# Patient Record
Sex: MALE | Race: ASIAN | NOT HISPANIC OR LATINO | Employment: UNEMPLOYED | URBAN - METROPOLITAN AREA
[De-identification: names, ages, dates, MRNs, and addresses within clinical notes are randomized per-mention and may not be internally consistent; named-entity substitution may affect disease eponyms.]

---

## 2023-06-12 ENCOUNTER — APPOINTMENT (OUTPATIENT)
Dept: LAB | Facility: LAB | Age: 78
End: 2023-06-12
Payer: MEDICARE

## 2023-06-12 ENCOUNTER — OFFICE VISIT (OUTPATIENT)
Dept: PRIMARY CARE | Facility: CLINIC | Age: 78
End: 2023-06-12
Payer: MEDICARE

## 2023-06-12 ENCOUNTER — LAB (OUTPATIENT)
Dept: LAB | Facility: LAB | Age: 78
End: 2023-06-12
Payer: MEDICARE

## 2023-06-12 VITALS
DIASTOLIC BLOOD PRESSURE: 68 MMHG | SYSTOLIC BLOOD PRESSURE: 118 MMHG | HEART RATE: 66 BPM | BODY MASS INDEX: 21.03 KG/M2 | WEIGHT: 142 LBS | RESPIRATION RATE: 14 BRPM | HEIGHT: 69 IN

## 2023-06-12 DIAGNOSIS — G89.29 CHRONIC PAIN OF BOTH SHOULDERS: ICD-10-CM

## 2023-06-12 DIAGNOSIS — M25.512 CHRONIC PAIN OF BOTH SHOULDERS: ICD-10-CM

## 2023-06-12 DIAGNOSIS — E11.9 TYPE 2 DIABETES MELLITUS WITHOUT COMPLICATION, WITHOUT LONG-TERM CURRENT USE OF INSULIN (MULTI): ICD-10-CM

## 2023-06-12 DIAGNOSIS — Z00.00 ROUTINE MEDICAL EXAM: ICD-10-CM

## 2023-06-12 DIAGNOSIS — K21.9 GERD WITHOUT ESOPHAGITIS: ICD-10-CM

## 2023-06-12 DIAGNOSIS — I10 PRIMARY HYPERTENSION: ICD-10-CM

## 2023-06-12 DIAGNOSIS — N40.1 BENIGN PROSTATIC HYPERPLASIA WITH URINARY FREQUENCY: ICD-10-CM

## 2023-06-12 DIAGNOSIS — E78.00 PURE HYPERCHOLESTEROLEMIA: ICD-10-CM

## 2023-06-12 DIAGNOSIS — M25.511 CHRONIC PAIN OF BOTH SHOULDERS: ICD-10-CM

## 2023-06-12 DIAGNOSIS — H90.3 SENSORINEURAL HEARING LOSS (SNHL) OF BOTH EARS: ICD-10-CM

## 2023-06-12 DIAGNOSIS — E11.9 TYPE 2 DIABETES MELLITUS WITHOUT COMPLICATION, WITHOUT LONG-TERM CURRENT USE OF INSULIN (MULTI): Primary | ICD-10-CM

## 2023-06-12 DIAGNOSIS — R35.0 BENIGN PROSTATIC HYPERPLASIA WITH URINARY FREQUENCY: ICD-10-CM

## 2023-06-12 PROBLEM — R07.89 OTHER CHEST PAIN: Status: ACTIVE | Noted: 2023-06-12

## 2023-06-12 LAB
ALANINE AMINOTRANSFERASE (SGPT) (U/L) IN SER/PLAS: 14 U/L (ref 10–52)
ALBUMIN (G/DL) IN SER/PLAS: 4.3 G/DL (ref 3.4–5)
ALKALINE PHOSPHATASE (U/L) IN SER/PLAS: 62 U/L (ref 33–136)
ANION GAP IN SER/PLAS: 9 MMOL/L (ref 10–20)
ASPARTATE AMINOTRANSFERASE (SGOT) (U/L) IN SER/PLAS: 18 U/L (ref 9–39)
BILIRUBIN TOTAL (MG/DL) IN SER/PLAS: 0.4 MG/DL (ref 0–1.2)
CALCIUM (MG/DL) IN SER/PLAS: 9.5 MG/DL (ref 8.6–10.3)
CARBON DIOXIDE, TOTAL (MMOL/L) IN SER/PLAS: 31 MMOL/L (ref 21–32)
CHLORIDE (MMOL/L) IN SER/PLAS: 97 MMOL/L (ref 98–107)
CHOLESTEROL (MG/DL) IN SER/PLAS: 175 MG/DL (ref 0–199)
CHOLESTEROL IN HDL (MG/DL) IN SER/PLAS: 62.3 MG/DL
CHOLESTEROL/HDL RATIO: 2.8
CREATININE (MG/DL) IN SER/PLAS: 1.08 MG/DL (ref 0.5–1.3)
ERYTHROCYTE DISTRIBUTION WIDTH (RATIO) BY AUTOMATED COUNT: 13.4 % (ref 11.5–14.5)
ERYTHROCYTE MEAN CORPUSCULAR HEMOGLOBIN CONCENTRATION (G/DL) BY AUTOMATED: 33.2 G/DL (ref 32–36)
ERYTHROCYTE MEAN CORPUSCULAR VOLUME (FL) BY AUTOMATED COUNT: 93 FL (ref 80–100)
ERYTHROCYTES (10*6/UL) IN BLOOD BY AUTOMATED COUNT: 4.27 X10E12/L (ref 4.5–5.9)
GFR MALE: 70 ML/MIN/1.73M2
GLUCOSE (MG/DL) IN SER/PLAS: 116 MG/DL (ref 74–99)
HEMATOCRIT (%) IN BLOOD BY AUTOMATED COUNT: 39.7 % (ref 41–52)
HEMOGLOBIN (G/DL) IN BLOOD: 13.2 G/DL (ref 13.5–17.5)
LDL: 94 MG/DL (ref 0–99)
LEUKOCYTES (10*3/UL) IN BLOOD BY AUTOMATED COUNT: 3.3 X10E9/L (ref 4.4–11.3)
PLATELETS (10*3/UL) IN BLOOD AUTOMATED COUNT: 172 X10E9/L (ref 150–450)
POTASSIUM (MMOL/L) IN SER/PLAS: 4.1 MMOL/L (ref 3.5–5.3)
PROTEIN TOTAL: 6.4 G/DL (ref 6.4–8.2)
SODIUM (MMOL/L) IN SER/PLAS: 133 MMOL/L (ref 136–145)
TRIGLYCERIDE (MG/DL) IN SER/PLAS: 93 MG/DL (ref 0–149)
UREA NITROGEN (MG/DL) IN SER/PLAS: 18 MG/DL (ref 6–23)
VLDL: 19 MG/DL (ref 0–40)

## 2023-06-12 PROCEDURE — 3078F DIAST BP <80 MM HG: CPT | Performed by: FAMILY MEDICINE

## 2023-06-12 PROCEDURE — 85027 COMPLETE CBC AUTOMATED: CPT

## 2023-06-12 PROCEDURE — 1160F RVW MEDS BY RX/DR IN RCRD: CPT | Performed by: FAMILY MEDICINE

## 2023-06-12 PROCEDURE — G0439 PPPS, SUBSEQ VISIT: HCPCS | Performed by: FAMILY MEDICINE

## 2023-06-12 PROCEDURE — 1159F MED LIST DOCD IN RCRD: CPT | Performed by: FAMILY MEDICINE

## 2023-06-12 PROCEDURE — 36415 COLL VENOUS BLD VENIPUNCTURE: CPT

## 2023-06-12 PROCEDURE — 80061 LIPID PANEL: CPT

## 2023-06-12 PROCEDURE — 1036F TOBACCO NON-USER: CPT | Performed by: FAMILY MEDICINE

## 2023-06-12 PROCEDURE — 3074F SYST BP LT 130 MM HG: CPT | Performed by: FAMILY MEDICINE

## 2023-06-12 PROCEDURE — 82043 UR ALBUMIN QUANTITATIVE: CPT

## 2023-06-12 PROCEDURE — 80053 COMPREHEN METABOLIC PANEL: CPT

## 2023-06-12 PROCEDURE — 82570 ASSAY OF URINE CREATININE: CPT

## 2023-06-12 PROCEDURE — 99214 OFFICE O/P EST MOD 30 MIN: CPT | Performed by: FAMILY MEDICINE

## 2023-06-12 PROCEDURE — 83036 HEMOGLOBIN GLYCOSYLATED A1C: CPT

## 2023-06-12 PROCEDURE — 1170F FXNL STATUS ASSESSED: CPT | Performed by: FAMILY MEDICINE

## 2023-06-12 RX ORDER — METFORMIN HYDROCHLORIDE 500 MG/1
1 TABLET ORAL 2 TIMES DAILY
COMMUNITY
Start: 2022-12-09 | End: 2023-06-12 | Stop reason: SDUPTHER

## 2023-06-12 RX ORDER — FINASTERIDE 5 MG/1
5 TABLET, FILM COATED ORAL DAILY
Qty: 90 TABLET | Refills: 3 | Status: SHIPPED | OUTPATIENT
Start: 2023-06-12 | End: 2023-09-14 | Stop reason: SDUPTHER

## 2023-06-12 RX ORDER — TRIAMCINOLONE ACETONIDE 1 MG/G
CREAM TOPICAL 2 TIMES DAILY
COMMUNITY
Start: 2022-08-23

## 2023-06-12 RX ORDER — PRAVASTATIN SODIUM 40 MG/1
40 TABLET ORAL NIGHTLY
Qty: 90 TABLET | Refills: 3 | Status: SHIPPED | OUTPATIENT
Start: 2023-06-12 | End: 2023-09-14 | Stop reason: SDUPTHER

## 2023-06-12 RX ORDER — TAMSULOSIN HYDROCHLORIDE 0.4 MG/1
0.4 CAPSULE ORAL 2 TIMES DAILY
Qty: 180 CAPSULE | Refills: 3 | Status: SHIPPED | OUTPATIENT
Start: 2023-06-12 | End: 2023-09-14 | Stop reason: SDUPTHER

## 2023-06-12 RX ORDER — HYDRALAZINE HYDROCHLORIDE 10 MG/1
TABLET, FILM COATED ORAL
COMMUNITY
Start: 2023-06-11 | End: 2023-09-14 | Stop reason: SDUPTHER

## 2023-06-12 RX ORDER — OMEPRAZOLE 40 MG/1
40 CAPSULE, DELAYED RELEASE ORAL
Qty: 90 CAPSULE | Refills: 3 | Status: SHIPPED | OUTPATIENT
Start: 2023-06-12 | End: 2023-09-14 | Stop reason: ALTCHOICE

## 2023-06-12 RX ORDER — PRAVASTATIN SODIUM 40 MG/1
1 TABLET ORAL NIGHTLY
COMMUNITY
Start: 2022-12-04 | End: 2023-06-12 | Stop reason: SDUPTHER

## 2023-06-12 RX ORDER — FINASTERIDE 5 MG/1
1 TABLET, FILM COATED ORAL DAILY
COMMUNITY
Start: 2022-12-04 | End: 2023-06-12 | Stop reason: SDUPTHER

## 2023-06-12 RX ORDER — METFORMIN HYDROCHLORIDE 500 MG/1
500 TABLET ORAL 2 TIMES DAILY
Qty: 180 TABLET | Refills: 3 | Status: SHIPPED | OUTPATIENT
Start: 2023-06-12 | End: 2023-09-14 | Stop reason: SDUPTHER

## 2023-06-12 RX ORDER — LISINOPRIL 10 MG/1
10 TABLET ORAL 2 TIMES DAILY
Qty: 90 TABLET | Refills: 3 | Status: SHIPPED | OUTPATIENT
Start: 2023-06-12 | End: 2023-09-14 | Stop reason: SDUPTHER

## 2023-06-12 RX ORDER — HYDROGEN PEROXIDE 3 %
SOLUTION, NON-ORAL MISCELLANEOUS
COMMUNITY
Start: 2022-12-17 | End: 2023-09-14 | Stop reason: SDUPTHER

## 2023-06-12 RX ORDER — TAMSULOSIN HYDROCHLORIDE 0.4 MG/1
0.4 CAPSULE ORAL 2 TIMES DAILY
COMMUNITY
Start: 2022-08-23 | End: 2023-06-12 | Stop reason: SDUPTHER

## 2023-06-12 RX ORDER — CETIRIZINE HYDROCHLORIDE 10 MG/1
10 TABLET ORAL DAILY
COMMUNITY
Start: 2022-08-01

## 2023-06-12 RX ORDER — LISINOPRIL 10 MG/1
1 TABLET ORAL 2 TIMES DAILY
COMMUNITY
Start: 2022-12-04 | End: 2023-06-12 | Stop reason: SDUPTHER

## 2023-06-12 ASSESSMENT — ACTIVITIES OF DAILY LIVING (ADL)
TAKING_MEDICATION: INDEPENDENT
MANAGING_FINANCES: INDEPENDENT
DOING_HOUSEWORK: INDEPENDENT
BATHING: NEEDS ASSISTANCE
GROCERY_SHOPPING: INDEPENDENT
DRESSING: INDEPENDENT

## 2023-06-12 ASSESSMENT — PATIENT HEALTH QUESTIONNAIRE - PHQ9
SUM OF ALL RESPONSES TO PHQ9 QUESTIONS 1 AND 2: 0
1. LITTLE INTEREST OR PLEASURE IN DOING THINGS: NOT AT ALL
2. FEELING DOWN, DEPRESSED OR HOPELESS: NOT AT ALL

## 2023-06-12 NOTE — PROGRESS NOTES
"Subjective   Patient ID: Yinka Wasserman is a 78 y.o. male who presents for Medicare Annual Wellness Visit Subsequent (Follow up).    HPI   Patient has been compliant with taking all  current medications. FBG has been at 100's most days. No hypoglycemic episodes. No polydipsia, polyuria. No lower extremities skin lesion. stable LE numbness and  tingling. No blurry vision. No GI upset  or muscle ache while on statin for high lipids. Normal appetite. No CP, HA, dizziness, heart palpitation. No claudication or cold LE.  No LE edema. No imbalance or falls. Good mood.     Review of Systems    Objective   /68   Pulse 66   Resp 14   Ht 1.753 m (5' 9\")   Wt 64.4 kg (142 lb)   BMI 20.97 kg/m²     Physical Exam  NAD, well groomed, No sclera icterus. + b/l hearing loss, neck: supple, no cervical or axillary lymphadenopathy,  lungs: CTA b/l, heart: RRR, No LE edema, normal pedal pulses, abd: soft, no tenderness, BS+, normal strength and sensation  at bilateral lower extremities. No skin lesions at bilateral feet.  Good balance. CNII-XII were grossly intact, good judgment and memory. No depressed mood.  Assessment/Plan   Problem List Items Addressed This Visit          Circulatory    Primary hypertension     BP has been controlled. Continue BP pills. DASH diet and regular exercise.          Relevant Medications    lisinopril 10 mg tablet    Other Relevant Orders    CBC    Comprehensive Metabolic Panel       Digestive    GERD without esophagitis     GERD:  Acid reflux has been well controlled with current ppi. Continue the same. Informed pt of the SE of PPI such as kidney disease, low magnesium, C-dif infection, dementia, osteoporosis/fracture etc. Recommend to avoid lying down within 4 hours of eating food.  Elevate the head of bed by one foot. Avoid EOTH, mint, citrus, chocolate. Pt declined to see a GI for further evaluation.           Relevant Medications    omeprazole (PriLOSEC) 40 mg DR capsule       " Musculoskeletal    Chronic pain of both shoulders     Stretch exercise, Call office if symptoms do not resolve in 10 days           Relevant Orders    CBC       Endocrine/Metabolic    Type 2 diabetes mellitus without complication, without long-term current use of insulin (CMS/[facility]) - Primary     DMII, well controlled. Continue current medications. Check A1C, urine albumin. advise eye exam by an OD yearly and checking bilateral feet for skin lesions qhs. Healthy diet and regular exercise.          Relevant Medications    metFORMIN (Glucophage) 500 mg tablet    Other Relevant Orders    Albumin , Urine Random    Hemoglobin A1C       Other    Routine medical exam    Sensorineural hearing loss (SNHL) of both ears     Ent eval         Relevant Orders    Referral to ENT    Pure hypercholesterolemia     Hyperlipidemia on statin. No GI upset or muscle ache. check labs for evaluation.  Health diet and regular exercise.          Relevant Medications    pravastatin (Pravachol) 40 mg tablet    Other Relevant Orders    CBC    Lipid Panel    Benign prostatic hyperplasia with urinary frequency     Pt cont to have urinary frequency. Uro eval         Relevant Medications    finasteride (Proscar) 5 mg tablet    tamsulosin (Flomax) 0.4 mg 24 hr capsule    Other Relevant Orders    Referral to Urology        Medicare wellness visit: pt was capable of performing all his ADLs and IADLs. Pt has good memory and cognitive function. Recommend healthy diet and regular exercise. Advise eye exam by an OD yearly for glaucoma screen and dental exam every 6 months. check lipids.   will monitor blood pressure, cholesterol levels and weight regularly. Recommend sunscreen application if exposed to the sun when the UV index is over 2. Recommend shingle vaccines, hep B vaccine,  pcv20. Recommend pt to clear throw rugs at home and keep good lighting at night to avoid falls. Keep hot water for shower below 120F to avoid burn injury.  recommend grab bar at  bathtub.   recommend to update living will and DPOA  pt stated that he had been taking all current  medications as prescribed.

## 2023-06-12 NOTE — ASSESSMENT & PLAN NOTE
DMII, well controlled. Continue current medications. Check A1C, urine albumin. advise eye exam by an OD yearly and checking bilateral feet for skin lesions qhs. Healthy diet and regular exercise.

## 2023-06-12 NOTE — ASSESSMENT & PLAN NOTE
GERD:  Acid reflux has been well controlled with current ppi. Continue the same. Informed pt of the SE of PPI such as kidney disease, low magnesium, C-dif infection, dementia, osteoporosis/fracture etc. Recommend to avoid lying down within 4 hours of eating food.  Elevate the head of bed by one foot. Avoid EOTH, mint, citrus, chocolate. Pt declined to see a GI for further evaluation.

## 2023-06-12 NOTE — ASSESSMENT & PLAN NOTE
Hyperlipidemia on statin. No GI upset or muscle ache. check labs for evaluation.  Health diet and regular exercise.

## 2023-06-13 LAB
ALBUMIN (MG/L) IN URINE: 7.6 MG/L
ALBUMIN/CREATININE (UG/MG) IN URINE: 5.1 UG/MG CRT (ref 0–30)
CREATININE (MG/DL) IN URINE: 150 MG/DL (ref 20–370)
ESTIMATED AVERAGE GLUCOSE FOR HBA1C: 128 MG/DL
HEMOGLOBIN A1C/HEMOGLOBIN TOTAL IN BLOOD: 6.1 %

## 2023-06-19 ENCOUNTER — LAB (OUTPATIENT)
Dept: LAB | Facility: LAB | Age: 78
End: 2023-06-19
Payer: MEDICARE

## 2023-06-19 ENCOUNTER — OFFICE VISIT (OUTPATIENT)
Dept: PRIMARY CARE | Facility: CLINIC | Age: 78
End: 2023-06-19
Payer: MEDICARE

## 2023-06-19 VITALS — HEART RATE: 76 BPM | RESPIRATION RATE: 14 BRPM | SYSTOLIC BLOOD PRESSURE: 128 MMHG | DIASTOLIC BLOOD PRESSURE: 79 MMHG

## 2023-06-19 DIAGNOSIS — D64.9 ANEMIA, UNSPECIFIED TYPE: Primary | ICD-10-CM

## 2023-06-19 DIAGNOSIS — D72.819 LEUKOPENIA, UNSPECIFIED TYPE: ICD-10-CM

## 2023-06-19 DIAGNOSIS — E87.1 HYPONATREMIA: ICD-10-CM

## 2023-06-19 DIAGNOSIS — D64.9 ANEMIA, UNSPECIFIED TYPE: ICD-10-CM

## 2023-06-19 LAB
COBALAMIN (VITAMIN B12) (PG/ML) IN SER/PLAS: 372 PG/ML (ref 211–911)
FERRITIN (UG/LL) IN SER/PLAS: 86 UG/L (ref 20–300)
FOLATE (NG/ML) IN SER/PLAS: 14 NG/ML
IRON (UG/DL) IN SER/PLAS: 111 UG/DL (ref 35–150)
IRON BINDING CAPACITY (UG/DL) IN SER/PLAS: 358 UG/DL (ref 240–445)
IRON SATURATION (%) IN SER/PLAS: 31 % (ref 25–45)

## 2023-06-19 PROCEDURE — 36415 COLL VENOUS BLD VENIPUNCTURE: CPT

## 2023-06-19 PROCEDURE — 82607 VITAMIN B-12: CPT

## 2023-06-19 PROCEDURE — 1160F RVW MEDS BY RX/DR IN RCRD: CPT | Performed by: FAMILY MEDICINE

## 2023-06-19 PROCEDURE — 3074F SYST BP LT 130 MM HG: CPT | Performed by: FAMILY MEDICINE

## 2023-06-19 PROCEDURE — 1036F TOBACCO NON-USER: CPT | Performed by: FAMILY MEDICINE

## 2023-06-19 PROCEDURE — 3078F DIAST BP <80 MM HG: CPT | Performed by: FAMILY MEDICINE

## 2023-06-19 PROCEDURE — 99214 OFFICE O/P EST MOD 30 MIN: CPT | Performed by: FAMILY MEDICINE

## 2023-06-19 PROCEDURE — 83550 IRON BINDING TEST: CPT

## 2023-06-19 PROCEDURE — 82746 ASSAY OF FOLIC ACID SERUM: CPT

## 2023-06-19 PROCEDURE — 82728 ASSAY OF FERRITIN: CPT

## 2023-06-19 PROCEDURE — 1159F MED LIST DOCD IN RCRD: CPT | Performed by: FAMILY MEDICINE

## 2023-06-19 PROCEDURE — 83540 ASSAY OF IRON: CPT

## 2023-06-19 NOTE — PROGRESS NOTES
Subjective   Patient ID: Yinka Wasserman is a 78 y.o. male who presents for anemia and low Na+  HPI   Hgb, wbc and NA+ were low. No abd pain, dark stools. Nausea, confusion or poor appetite. Good balance  Review of Systems    Objective   /79   Pulse 76   Resp 14     Physical Exam  NAD, well groomed, no pale conjunctiva, No sclera icterus. neck: supple, no cervical or axillary lymphadenopathy,  lungs: CTA b/l, heart: RRR, cap refill was less than 2 secs, No LE edema, Good balance. CNII-XII were grossly intact, good judgment and memory. No depressed mood.    Assessment/Plan   Problem List Items Addressed This Visit       Anemia - Primary     Check labs for  eval         Relevant Orders    Vitamin B12 (Completed)    Iron and TIBC    Ferritin    Folate    Hyponatremia     Restrict fluid intake to 1500 ml a day. Will monitor.          Leukopenia     Check labs for  eval

## 2023-09-14 ENCOUNTER — OFFICE VISIT (OUTPATIENT)
Dept: PRIMARY CARE | Facility: CLINIC | Age: 78
End: 2023-09-14
Payer: MEDICARE

## 2023-09-14 VITALS — SYSTOLIC BLOOD PRESSURE: 130 MMHG | RESPIRATION RATE: 14 BRPM | DIASTOLIC BLOOD PRESSURE: 68 MMHG | HEART RATE: 76 BPM

## 2023-09-14 DIAGNOSIS — R35.0 BENIGN PROSTATIC HYPERPLASIA WITH URINARY FREQUENCY: ICD-10-CM

## 2023-09-14 DIAGNOSIS — E87.1 HYPONATREMIA: ICD-10-CM

## 2023-09-14 DIAGNOSIS — E78.00 PURE HYPERCHOLESTEROLEMIA: ICD-10-CM

## 2023-09-14 DIAGNOSIS — N40.1 BENIGN PROSTATIC HYPERPLASIA WITH URINARY FREQUENCY: ICD-10-CM

## 2023-09-14 DIAGNOSIS — D64.9 ANEMIA, UNSPECIFIED TYPE: ICD-10-CM

## 2023-09-14 DIAGNOSIS — K21.9 GERD WITHOUT ESOPHAGITIS: Primary | ICD-10-CM

## 2023-09-14 DIAGNOSIS — I10 PRIMARY HYPERTENSION: ICD-10-CM

## 2023-09-14 DIAGNOSIS — E11.9 TYPE 2 DIABETES MELLITUS WITHOUT COMPLICATION, WITHOUT LONG-TERM CURRENT USE OF INSULIN (MULTI): ICD-10-CM

## 2023-09-14 PROBLEM — R07.89 OTHER CHEST PAIN: Status: RESOLVED | Noted: 2023-06-12 | Resolved: 2023-09-14

## 2023-09-14 PROCEDURE — 1160F RVW MEDS BY RX/DR IN RCRD: CPT | Performed by: FAMILY MEDICINE

## 2023-09-14 PROCEDURE — 90686 IIV4 VACC NO PRSV 0.5 ML IM: CPT | Performed by: FAMILY MEDICINE

## 2023-09-14 PROCEDURE — G0008 ADMIN INFLUENZA VIRUS VAC: HCPCS | Performed by: FAMILY MEDICINE

## 2023-09-14 PROCEDURE — 3075F SYST BP GE 130 - 139MM HG: CPT | Performed by: FAMILY MEDICINE

## 2023-09-14 PROCEDURE — 1036F TOBACCO NON-USER: CPT | Performed by: FAMILY MEDICINE

## 2023-09-14 PROCEDURE — 1159F MED LIST DOCD IN RCRD: CPT | Performed by: FAMILY MEDICINE

## 2023-09-14 PROCEDURE — 3078F DIAST BP <80 MM HG: CPT | Performed by: FAMILY MEDICINE

## 2023-09-14 PROCEDURE — 99214 OFFICE O/P EST MOD 30 MIN: CPT | Performed by: FAMILY MEDICINE

## 2023-09-14 RX ORDER — LISINOPRIL 10 MG/1
10 TABLET ORAL 2 TIMES DAILY
Qty: 90 TABLET | Refills: 3 | Status: SHIPPED | OUTPATIENT
Start: 2023-09-14 | End: 2023-12-11

## 2023-09-14 RX ORDER — HYDROGEN PEROXIDE 3 %
SOLUTION, NON-ORAL MISCELLANEOUS
Qty: 180 CAPSULE | Refills: 3 | Status: SHIPPED | OUTPATIENT
Start: 2023-09-14 | End: 2023-12-12 | Stop reason: ALTCHOICE

## 2023-09-14 RX ORDER — METFORMIN HYDROCHLORIDE 500 MG/1
500 TABLET ORAL 2 TIMES DAILY
Qty: 180 TABLET | Refills: 3 | Status: SHIPPED | OUTPATIENT
Start: 2023-09-14

## 2023-09-14 RX ORDER — TAMSULOSIN HYDROCHLORIDE 0.4 MG/1
0.4 CAPSULE ORAL 2 TIMES DAILY
Qty: 180 CAPSULE | Refills: 3 | Status: SHIPPED | OUTPATIENT
Start: 2023-09-14

## 2023-09-14 RX ORDER — HYDRALAZINE HYDROCHLORIDE 10 MG/1
TABLET, FILM COATED ORAL
Qty: 180 TABLET | Refills: 3 | Status: SHIPPED | OUTPATIENT
Start: 2023-09-14

## 2023-09-14 RX ORDER — PRAVASTATIN SODIUM 40 MG/1
40 TABLET ORAL NIGHTLY
Qty: 90 TABLET | Refills: 3 | Status: SHIPPED | OUTPATIENT
Start: 2023-09-14 | End: 2023-12-13 | Stop reason: ALTCHOICE

## 2023-09-14 RX ORDER — FINASTERIDE 5 MG/1
5 TABLET, FILM COATED ORAL DAILY
Qty: 90 TABLET | Refills: 3 | Status: SHIPPED | OUTPATIENT
Start: 2023-09-14

## 2023-09-14 NOTE — ASSESSMENT & PLAN NOTE
Hyperlipidemia on statin. No GI upset or muscle ache. Will check labs for evaluation.  Health diet and regular exercise.

## 2023-09-14 NOTE — ASSESSMENT & PLAN NOTE
DMII, well controlled. Continue current medications. Will Check A1C, urine albumin. advise eye exam by an OD yearly and checking bilateral feet for skin lesions qhs.

## 2023-09-14 NOTE — ASSESSMENT & PLAN NOTE
GERD:  Acid reflux has been well controlled with current ppi. Continue the same. Informed pt of the SE of PPI such as kidney disease, low magnesium, C-dif infection, dementia, osteoporosis/fracture etc. Recommend to avoid lying down within 4 hours of eating food.  Elevate the head of bed by one foot. Avoid EOTH, mint, citrus, chocolate.

## 2023-09-14 NOTE — PROGRESS NOTES
Subjective   Patient ID: Yinka Wasserman is a 78 y.o. male who presents for fu  HPI   Patient has been compliant with taking all  current medications. FBG has been at 110's most days. No hypoglycemic episodes. No polydipsia, polyuria or significant weight changes. No lower extremities skin lesion. No LE numbness or tingling. No blurry vision. No GI upset  or muscle ache while on statin for high lipids. Normal appetite. No CP, HA, dizziness, heart palpitation. No claudication or cold LE.  No LE edema. No imbalance or falls. Good mood. symptoms of acid reflux and heart burn have been well controlled with current ppi. Pt denies having cough or wheezing. No trouble swallowing food. Pt has normal appetite. No nausea, vomiting, abd pain, wt loss, dark stools. Stable urination. No dark stools or fatigue    Review of Systems    Objective   /68   Pulse 76   Resp 14     Physical Exam  NAD, well groomed, No sclera icterus. neck: supple, no cervical or axillary lymphadenopathy,  lungs: CTA b/l, heart: RRR, cap refill was less than 2 secs, No LE edema, normal pedal pulses, abd: soft, no tenderness, BS+, normal strength and sensation  at bilateral lower extremities. No skin lesions at bilateral feet.  Good balance. CNII-XII were grossly intact, good judgment and memory. No depressed mood.    Assessment/Plan   Problem List Items Addressed This Visit       Type 2 diabetes mellitus without complication, without long-term current use of insulin (CMS/[facility])     DMII, well controlled. Continue current medications. Will Check A1C, urine albumin. advise eye exam by an OD yearly and checking bilateral feet for skin lesions qhs.          Relevant Medications    metFORMIN (Glucophage) 500 mg tablet    Pure hypercholesterolemia     Hyperlipidemia on statin. No GI upset or muscle ache. Will check labs for evaluation.  Health diet and regular exercise.          Relevant Medications    pravastatin (Pravachol) 40 mg tablet    Primary  hypertension     BP has been controlled. Continue BP pills. keep a daily  bp log and bring in the log at the next office visit. Call office if BP is persistently over 130/80. DASH diet and regular exercise.          Relevant Medications    hydrALAZINE (Apresoline) 10 mg tablet    lisinopril 10 mg tablet    Benign prostatic hyperplasia with urinary frequency    Relevant Medications    finasteride (Proscar) 5 mg tablet    tamsulosin (Flomax) 0.4 mg 24 hr capsule    GERD without esophagitis - Primary     GERD:  Acid reflux has been well controlled with current ppi. Continue the same. Informed pt of the SE of PPI such as kidney disease, low magnesium, C-dif infection, dementia, osteoporosis/fracture etc. Recommend to avoid lying down within 4 hours of eating food.  Elevate the head of bed by one foot. Avoid EOTH, mint, citrus, chocolate.          Relevant Medications    esomeprazole (NexIUM) 20 mg DR capsule    Anemia     Check cbc         Relevant Orders    CBC    Hyponatremia     Check bmp         Relevant Orders    Basic Metabolic Panel     Patient is not sick today. Patient is not anxious about getting a shot today. Patient has never had Guillain Barré syndrome. Patient has never felt dizzy or faint before, during, or after a shot. Patient has never had a serious reaction to influenza vaccine in the past.  Patient has never had an allergy to an ingredient of influenza vaccine.  Flu shot was given via IM today.

## 2023-09-15 ENCOUNTER — LAB (OUTPATIENT)
Dept: LAB | Facility: LAB | Age: 78
End: 2023-09-15
Payer: MEDICARE

## 2023-09-15 DIAGNOSIS — D64.9 ANEMIA, UNSPECIFIED TYPE: ICD-10-CM

## 2023-09-15 DIAGNOSIS — D72.819 LEUKOPENIA, UNSPECIFIED TYPE: Primary | ICD-10-CM

## 2023-09-15 DIAGNOSIS — E87.1 HYPONATREMIA: ICD-10-CM

## 2023-09-15 LAB
ANION GAP IN SER/PLAS: 11 MMOL/L (ref 10–20)
CALCIUM (MG/DL) IN SER/PLAS: 9.1 MG/DL (ref 8.6–10.3)
CARBON DIOXIDE, TOTAL (MMOL/L) IN SER/PLAS: 30 MMOL/L (ref 21–32)
CHLORIDE (MMOL/L) IN SER/PLAS: 101 MMOL/L (ref 98–107)
CREATININE (MG/DL) IN SER/PLAS: 1.12 MG/DL (ref 0.5–1.3)
ERYTHROCYTE DISTRIBUTION WIDTH (RATIO) BY AUTOMATED COUNT: 12.5 % (ref 11.5–14.5)
ERYTHROCYTE MEAN CORPUSCULAR HEMOGLOBIN CONCENTRATION (G/DL) BY AUTOMATED: 33.2 G/DL (ref 32–36)
ERYTHROCYTE MEAN CORPUSCULAR VOLUME (FL) BY AUTOMATED COUNT: 93 FL (ref 80–100)
ERYTHROCYTES (10*6/UL) IN BLOOD BY AUTOMATED COUNT: 4.68 X10E12/L (ref 4.5–5.9)
GFR MALE: 67 ML/MIN/1.73M2
GLUCOSE (MG/DL) IN SER/PLAS: 155 MG/DL (ref 74–99)
HEMATOCRIT (%) IN BLOOD BY AUTOMATED COUNT: 43.4 % (ref 41–52)
HEMOGLOBIN (G/DL) IN BLOOD: 14.4 G/DL (ref 13.5–17.5)
LEUKOCYTES (10*3/UL) IN BLOOD BY AUTOMATED COUNT: 2.8 X10E9/L (ref 4.4–11.3)
PLATELETS (10*3/UL) IN BLOOD AUTOMATED COUNT: 154 X10E9/L (ref 150–450)
POTASSIUM (MMOL/L) IN SER/PLAS: 4.5 MMOL/L (ref 3.5–5.3)
SODIUM (MMOL/L) IN SER/PLAS: 137 MMOL/L (ref 136–145)
UREA NITROGEN (MG/DL) IN SER/PLAS: 21 MG/DL (ref 6–23)

## 2023-09-15 PROCEDURE — 80048 BASIC METABOLIC PNL TOTAL CA: CPT

## 2023-09-15 PROCEDURE — 36415 COLL VENOUS BLD VENIPUNCTURE: CPT

## 2023-09-15 PROCEDURE — 85027 COMPLETE CBC AUTOMATED: CPT

## 2023-09-23 PROBLEM — R07.89 OTHER CHEST PAIN: Status: ACTIVE | Noted: 2023-09-23

## 2023-09-23 PROBLEM — J30.9 ALLERGIC RHINITIS: Status: ACTIVE | Noted: 2023-08-30

## 2023-09-23 PROBLEM — R35.1 NOCTURIA: Status: ACTIVE | Noted: 2023-09-23

## 2023-09-23 PROBLEM — N31.8 FREQUENCY-URGENCY SYNDROME: Status: ACTIVE | Noted: 2023-09-23

## 2023-09-23 PROBLEM — R15.9 STOOL INCONTINENCE: Status: ACTIVE | Noted: 2023-09-23

## 2023-09-23 RX ORDER — FAMOTIDINE 20 MG/1
20 TABLET, FILM COATED ORAL 2 TIMES DAILY
COMMUNITY

## 2023-09-23 RX ORDER — ESOMEPRAZOLE MAGNESIUM 40 MG/1
1 CAPSULE, DELAYED RELEASE ORAL DAILY PRN
COMMUNITY
Start: 2023-02-06

## 2023-09-23 RX ORDER — FLUTICASONE PROPIONATE 50 MCG
SPRAY, SUSPENSION (ML) NASAL
COMMUNITY
Start: 2021-11-02

## 2023-09-23 RX ORDER — OXYBUTYNIN CHLORIDE 5 MG/1
1 TABLET ORAL 2 TIMES DAILY
COMMUNITY
Start: 2023-07-03

## 2023-09-23 RX ORDER — OMEPRAZOLE 20 MG/1
20 CAPSULE, DELAYED RELEASE ORAL DAILY
COMMUNITY
Start: 2022-10-20 | End: 2023-12-12 | Stop reason: ALTCHOICE

## 2023-09-23 RX ORDER — AZELASTINE 1 MG/ML
2 SPRAY, METERED NASAL 2 TIMES DAILY
COMMUNITY

## 2023-10-23 ENCOUNTER — APPOINTMENT (OUTPATIENT)
Dept: UROLOGY | Facility: CLINIC | Age: 78
End: 2023-10-23
Payer: MEDICARE

## 2023-10-25 ENCOUNTER — OFFICE VISIT (OUTPATIENT)
Dept: PRIMARY CARE | Facility: CLINIC | Age: 78
End: 2023-10-25
Payer: MEDICARE

## 2023-10-25 VITALS — HEART RATE: 76 BPM | DIASTOLIC BLOOD PRESSURE: 76 MMHG | SYSTOLIC BLOOD PRESSURE: 132 MMHG | RESPIRATION RATE: 14 BRPM

## 2023-10-25 DIAGNOSIS — L02.519: Primary | ICD-10-CM

## 2023-10-25 PROCEDURE — 87075 CULTR BACTERIA EXCEPT BLOOD: CPT

## 2023-10-25 PROCEDURE — 1160F RVW MEDS BY RX/DR IN RCRD: CPT | Performed by: FAMILY MEDICINE

## 2023-10-25 PROCEDURE — 10060 I&D ABSCESS SIMPLE/SINGLE: CPT | Performed by: FAMILY MEDICINE

## 2023-10-25 PROCEDURE — 87070 CULTURE OTHR SPECIMN AEROBIC: CPT

## 2023-10-25 PROCEDURE — 1159F MED LIST DOCD IN RCRD: CPT | Performed by: FAMILY MEDICINE

## 2023-10-25 PROCEDURE — 3078F DIAST BP <80 MM HG: CPT | Performed by: FAMILY MEDICINE

## 2023-10-25 PROCEDURE — 1036F TOBACCO NON-USER: CPT | Performed by: FAMILY MEDICINE

## 2023-10-25 PROCEDURE — 99213 OFFICE O/P EST LOW 20 MIN: CPT | Performed by: FAMILY MEDICINE

## 2023-10-25 PROCEDURE — 3075F SYST BP GE 130 - 139MM HG: CPT | Performed by: FAMILY MEDICINE

## 2023-10-25 RX ORDER — DOXYCYCLINE 100 MG/1
100 CAPSULE ORAL 2 TIMES DAILY
Qty: 14 CAPSULE | Refills: 0 | Status: SHIPPED | OUTPATIENT
Start: 2023-10-25 | End: 2023-11-01

## 2023-10-25 NOTE — PROGRESS NOTES
Subjective   Patient ID: Yinka Wasserman is a 78 y.o. male who presents for left 5th swelling for 1 day    HPI   Patient developed a tender and warm lump at left 5 th finger 1 day ago. It grew bigger and more painful in the past night.  No fever, chills, chest pain, shortness of breath, heart palpitation, nausea, vomiting or abdominal pain. Patient had normal appetite. No headache, neck stiffness, dizziness, confusion or imbalance.     Review of Systems    Objective   /76   Pulse 76   Resp 14     Physical Exam  No acute distress. Eyes: PERRLA. no sclera icterus, No cervical or axillary lymph node tenderness or enlargement. Neck is supple. normal respiration effort,  Lungs: CTA b/l, Heart: RRR. Abdomen: soft, non-tenderness. Bowel sound: normal. There was a 1x1 cm fluctuant abscess at left 5th finger with warmth and tenderness, Patient walks with a good balance    Assessment/Plan        Lt 5th finger Abscess, under sterile condition and 1ml 1% lidocaine anesthesia, the abscess was I&D'd. Yellowish and purulent pus was drained from the abscess. The wound was irrigated and covered with sterile gauze. The purulent specimen was sent to lab for bacterial culture. doxycycline 100mg po bid for 7 days. Call office if fever, chills, nausea, vomiting, neck stiffness, headache, chest pain or sob occurs

## 2023-10-28 LAB
BACTERIA SPEC CULT: NORMAL
GRAM STN SPEC: NORMAL
GRAM STN SPEC: NORMAL

## 2023-11-01 ENCOUNTER — OFFICE VISIT (OUTPATIENT)
Dept: HEMATOLOGY/ONCOLOGY | Facility: CLINIC | Age: 78
End: 2023-11-01
Payer: MEDICARE

## 2023-11-01 VITALS
WEIGHT: 157.19 LBS | OXYGEN SATURATION: 96 % | HEART RATE: 55 BPM | SYSTOLIC BLOOD PRESSURE: 148 MMHG | RESPIRATION RATE: 16 BRPM | HEIGHT: 68 IN | BODY MASS INDEX: 23.82 KG/M2 | TEMPERATURE: 97.5 F | DIASTOLIC BLOOD PRESSURE: 77 MMHG

## 2023-11-01 DIAGNOSIS — D72.819 LEUKOPENIA, UNSPECIFIED TYPE: Primary | ICD-10-CM

## 2023-11-01 LAB
ALBUMIN SERPL BCP-MCNC: 4.3 G/DL (ref 3.4–5)
ALP SERPL-CCNC: 71 U/L (ref 33–136)
ALT SERPL W P-5'-P-CCNC: 23 U/L (ref 10–52)
ANION GAP SERPL CALC-SCNC: 13 MMOL/L (ref 10–20)
AST SERPL W P-5'-P-CCNC: 23 U/L (ref 9–39)
BASOPHILS # BLD AUTO: 0.01 X10*3/UL (ref 0–0.1)
BASOPHILS NFR BLD AUTO: 0.2 %
BILIRUB SERPL-MCNC: 0.3 MG/DL (ref 0–1.2)
BUN SERPL-MCNC: 23 MG/DL (ref 6–23)
CALCIUM SERPL-MCNC: 9.1 MG/DL (ref 8.6–10.3)
CHLORIDE SERPL-SCNC: 100 MMOL/L (ref 98–107)
CO2 SERPL-SCNC: 25 MMOL/L (ref 21–32)
CREAT SERPL-MCNC: 1.14 MG/DL (ref 0.5–1.3)
EOSINOPHIL # BLD AUTO: 0.06 X10*3/UL (ref 0–0.4)
EOSINOPHIL NFR BLD AUTO: 1.3 %
ERYTHROCYTE [DISTWIDTH] IN BLOOD BY AUTOMATED COUNT: 12.7 % (ref 11.5–14.5)
FERRITIN SERPL-MCNC: 20 NG/ML (ref 20–300)
GFR SERPL CREATININE-BSD FRML MDRD: 65 ML/MIN/1.73M*2
GLUCOSE SERPL-MCNC: 160 MG/DL (ref 74–99)
HCT VFR BLD AUTO: 42.6 % (ref 41–52)
HGB BLD-MCNC: 13.8 G/DL (ref 13.5–17.5)
IMM GRANULOCYTES # BLD AUTO: 0.01 X10*3/UL (ref 0–0.5)
IMM GRANULOCYTES NFR BLD AUTO: 0.2 % (ref 0–0.9)
IRON SATN MFR SERPL: 20 % (ref 25–45)
IRON SERPL-MCNC: 81 UG/DL (ref 35–150)
LDH SERPL L TO P-CCNC: 156 U/L (ref 84–246)
LYMPHOCYTES # BLD AUTO: 1.18 X10*3/UL (ref 0.8–3)
LYMPHOCYTES NFR BLD AUTO: 26.3 %
MCH RBC QN AUTO: 30.6 PG (ref 26–34)
MCHC RBC AUTO-ENTMCNC: 32.4 G/DL (ref 32–36)
MCV RBC AUTO: 95 FL (ref 80–100)
MONOCYTES # BLD AUTO: 0.4 X10*3/UL (ref 0.05–0.8)
MONOCYTES NFR BLD AUTO: 8.9 %
NEUTROPHILS # BLD AUTO: 2.82 X10*3/UL (ref 1.6–5.5)
NEUTROPHILS NFR BLD AUTO: 63.1 %
PLATELET # BLD AUTO: 174 X10*3/UL (ref 150–450)
POTASSIUM SERPL-SCNC: 4.8 MMOL/L (ref 3.5–5.3)
PROT SERPL-MCNC: 6.6 G/DL (ref 6.4–8.2)
RBC # BLD AUTO: 4.51 X10*6/UL (ref 4.5–5.9)
SODIUM SERPL-SCNC: 133 MMOL/L (ref 136–145)
TIBC SERPL-MCNC: 401 UG/DL (ref 240–445)
UIBC SERPL-MCNC: 320 UG/DL (ref 110–370)
WBC # BLD AUTO: 4.5 X10*3/UL (ref 4.4–11.3)

## 2023-11-01 PROCEDURE — 88188 FLOWCYTOMETRY/READ 9-15: CPT | Performed by: PATHOLOGY

## 2023-11-01 PROCEDURE — 88187 FLOWCYTOMETRY/READ 2-8: CPT | Performed by: PATHOLOGY

## 2023-11-01 PROCEDURE — 99214 OFFICE O/P EST MOD 30 MIN: CPT | Mod: 25 | Performed by: PHYSICIAN ASSISTANT

## 2023-11-01 PROCEDURE — 86704 HEP B CORE ANTIBODY TOTAL: CPT | Performed by: PHYSICIAN ASSISTANT

## 2023-11-01 PROCEDURE — 87340 HEPATITIS B SURFACE AG IA: CPT | Performed by: PHYSICIAN ASSISTANT

## 2023-11-01 PROCEDURE — 84165 PROTEIN E-PHORESIS SERUM: CPT | Performed by: PHYSICIAN ASSISTANT

## 2023-11-01 PROCEDURE — 83550 IRON BINDING TEST: CPT | Performed by: PHYSICIAN ASSISTANT

## 2023-11-01 PROCEDURE — 85025 COMPLETE CBC W/AUTO DIFF WBC: CPT | Performed by: PHYSICIAN ASSISTANT

## 2023-11-01 PROCEDURE — 83540 ASSAY OF IRON: CPT | Performed by: PHYSICIAN ASSISTANT

## 2023-11-01 PROCEDURE — 88189 FLOWCYTOMETRY/READ 16 & >: CPT | Performed by: PATHOLOGY

## 2023-11-01 PROCEDURE — 86708 HEPATITIS A ANTIBODY: CPT | Performed by: PHYSICIAN ASSISTANT

## 2023-11-01 PROCEDURE — 83615 LACTATE (LD) (LDH) ENZYME: CPT | Performed by: PHYSICIAN ASSISTANT

## 2023-11-01 PROCEDURE — 82784 ASSAY IGA/IGD/IGG/IGM EACH: CPT | Performed by: PHYSICIAN ASSISTANT

## 2023-11-01 PROCEDURE — 88185 FLOWCYTOMETRY/TC ADD-ON: CPT | Mod: TC | Performed by: PHYSICIAN ASSISTANT

## 2023-11-01 PROCEDURE — 86235 NUCLEAR ANTIGEN ANTIBODY: CPT | Performed by: PHYSICIAN ASSISTANT

## 2023-11-01 PROCEDURE — 88237 TISSUE CULTURE BONE MARROW: CPT | Performed by: PHYSICIAN ASSISTANT

## 2023-11-01 PROCEDURE — 86803 HEPATITIS C AB TEST: CPT | Performed by: PHYSICIAN ASSISTANT

## 2023-11-01 PROCEDURE — 80053 COMPREHEN METABOLIC PANEL: CPT | Performed by: PHYSICIAN ASSISTANT

## 2023-11-01 PROCEDURE — 82607 VITAMIN B-12: CPT | Performed by: PHYSICIAN ASSISTANT

## 2023-11-01 PROCEDURE — 99214 OFFICE O/P EST MOD 30 MIN: CPT | Performed by: PHYSICIAN ASSISTANT

## 2023-11-01 PROCEDURE — 86706 HEP B SURFACE ANTIBODY: CPT | Performed by: PHYSICIAN ASSISTANT

## 2023-11-01 PROCEDURE — 82746 ASSAY OF FOLIC ACID SERUM: CPT | Performed by: PHYSICIAN ASSISTANT

## 2023-11-01 PROCEDURE — 86334 IMMUNOFIX E-PHORESIS SERUM: CPT | Performed by: PHYSICIAN ASSISTANT

## 2023-11-01 PROCEDURE — 84075 ASSAY ALKALINE PHOSPHATASE: CPT | Performed by: PHYSICIAN ASSISTANT

## 2023-11-01 PROCEDURE — 86038 ANTINUCLEAR ANTIBODIES: CPT | Performed by: PHYSICIAN ASSISTANT

## 2023-11-01 PROCEDURE — 3078F DIAST BP <80 MM HG: CPT | Performed by: PHYSICIAN ASSISTANT

## 2023-11-01 PROCEDURE — 83521 IG LIGHT CHAINS FREE EACH: CPT | Performed by: PHYSICIAN ASSISTANT

## 2023-11-01 PROCEDURE — 82728 ASSAY OF FERRITIN: CPT | Performed by: PHYSICIAN ASSISTANT

## 2023-11-01 PROCEDURE — 1159F MED LIST DOCD IN RCRD: CPT | Performed by: PHYSICIAN ASSISTANT

## 2023-11-01 PROCEDURE — 86320 SERUM IMMUNOELECTROPHORESIS: CPT | Performed by: PHYSICIAN ASSISTANT

## 2023-11-01 PROCEDURE — 87389 HIV-1 AG W/HIV-1&-2 AB AG IA: CPT | Performed by: PHYSICIAN ASSISTANT

## 2023-11-01 PROCEDURE — 1160F RVW MEDS BY RX/DR IN RCRD: CPT | Performed by: PHYSICIAN ASSISTANT

## 2023-11-01 PROCEDURE — 84155 ASSAY OF PROTEIN SERUM: CPT | Performed by: PHYSICIAN ASSISTANT

## 2023-11-01 PROCEDURE — 1036F TOBACCO NON-USER: CPT | Performed by: PHYSICIAN ASSISTANT

## 2023-11-01 PROCEDURE — 3077F SYST BP >= 140 MM HG: CPT | Performed by: PHYSICIAN ASSISTANT

## 2023-11-01 PROCEDURE — 1125F AMNT PAIN NOTED PAIN PRSNT: CPT | Performed by: PHYSICIAN ASSISTANT

## 2023-11-01 PROCEDURE — 36415 COLL VENOUS BLD VENIPUNCTURE: CPT | Mod: GEALAB | Performed by: PHYSICIAN ASSISTANT

## 2023-11-01 ASSESSMENT — COLUMBIA-SUICIDE SEVERITY RATING SCALE - C-SSRS
2. HAVE YOU ACTUALLY HAD ANY THOUGHTS OF KILLING YOURSELF?: NO
1. IN THE PAST MONTH, HAVE YOU WISHED YOU WERE DEAD OR WISHED YOU COULD GO TO SLEEP AND NOT WAKE UP?: NO
6. HAVE YOU EVER DONE ANYTHING, STARTED TO DO ANYTHING, OR PREPARED TO DO ANYTHING TO END YOUR LIFE?: NO

## 2023-11-01 ASSESSMENT — ENCOUNTER SYMPTOMS
OCCASIONAL FEELINGS OF UNSTEADINESS: 0
LOSS OF SENSATION IN FEET: 0
DEPRESSION: 0

## 2023-11-01 ASSESSMENT — PAIN SCALES - GENERAL: PAINLEVEL: 4

## 2023-11-01 ASSESSMENT — PATIENT HEALTH QUESTIONNAIRE - PHQ9
2. FEELING DOWN, DEPRESSED OR HOPELESS: NOT AT ALL
SUM OF ALL RESPONSES TO PHQ9 QUESTIONS 1 AND 2: 0
1. LITTLE INTEREST OR PLEASURE IN DOING THINGS: NOT AT ALL

## 2023-11-01 NOTE — PROGRESS NOTES
Reason for Visit  Yinka Wasserman is a 78 y.o. male referred by Dr. Charlene Cordero for management ad evaluation of leucopenia.    PMH/PSH: DMII, HTN, HL, BPH, GERD, arthritis, neck surgery, sciatica.  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      History of Present Illness:  Upon review of labs, noted to have leucopenia w/o neutropenia since 8/2022. Normal H/H and platelet count.    On assessment, reports feeling well with good energy and appetite. Had recent thumb infection. Denies f/c/night sweats, frequent infection.     Review of Systems: All of the systems have been reviewed and are negative for complaints except what is stated in the HPI and/or past medical history.    Allergies and Intolerances:  No Known Allergies           Outpatient Medication Profile:  Current Outpatient Medications   Medication Sig Dispense Refill    azelastine (Astelin) 137 mcg (0.1 %) nasal spray Administer 2 sprays into each nostril 2 times a day.      cetirizine (ZyrTEC) 10 mg tablet Take 1 tablet (10 mg) by mouth once daily.      doxycycline (Vibramycin) 100 mg capsule Take 1 capsule (100 mg) by mouth 2 times a day for 7 days. Take with at least 8 ounces (large glass) of water, do not lie down for 30 minutes after 14 capsule 0    esomeprazole (NexIUM) 20 mg DR capsule TAKE 1 CAPSULE BY MOUTH TWICE DAILY replace omeprazole 180 capsule 3    esomeprazole (NexIUM) 40 mg DR capsule Take 1 capsule (40 mg) by mouth once daily as needed.      famotidine (Pepcid) 20 mg tablet Take 1 tablet (20 mg) by mouth 2 times a day.      finasteride (Proscar) 5 mg tablet Take 1 tablet (5 mg) by mouth once daily. 90 tablet 3    fluticasone (Flonase) 50 mcg/actuation nasal spray Flonase 50 MCG/ACT SUSP  Refills: 0      hydrALAZINE (Apresoline) 10 mg tablet 1 tab bid 180 tablet 3    lisinopril 10 mg tablet Take 1 tablet (10 mg) by mouth 2 times a day. 90 tablet 3    metFORMIN (Glucophage) 500 mg tablet Take 1  "tablet (500 mg) by mouth 2 times a day. 180 tablet 3    omeprazole (PriLOSEC) 20 mg DR capsule Take 1 capsule (20 mg) by mouth once daily.      oxybutynin (Ditropan) 5 mg tablet Take 1 tablet (5 mg) by mouth 2 times a day.      pravastatin (Pravachol) 40 mg tablet Take 1 tablet (40 mg) by mouth once daily at bedtime. 90 tablet 3    tamsulosin (Flomax) 0.4 mg 24 hr capsule Take 1 capsule (0.4 mg) by mouth 2 times a day. 180 capsule 3    triamcinolone (Kenalog) 0.1 % cream Apply topically 2 times a day. to affected area       No current facility-administered medications for this visit.        Vitals and Measurements:   Visit Vitals  /77 (BP Location: Left arm, Patient Position: Sitting, BP Cuff Size: Adult)   Pulse 55   Temp 36.4 °C (97.5 °F) (Temporal)   Resp 16        Physical Exam:   Constitutional: alert, awake and oriented, not in acute distress   HEENT: moist mucous membranes, normal nose   Neck: supple, no lymphadenopathy   EYES: PERRL, EOM intact, conjunctiva normal  Skin: no jaundice, rash or erythema  Neurological: AAOx3, no gross focal deficit   Psychiatric: normal mood and behavior     Lab Results:  Labs:  Lab Results   Component Value Date    WBC 4.5 11/01/2023    NEUTROABS 2.82 11/01/2023    IGABSOL 0.01 11/01/2023    LYMPHSABS 1.18 11/01/2023    MONOSABS 0.40 11/01/2023    EOSABS 0.06 11/01/2023    BASOSABS 0.01 11/01/2023    RBC 4.51 11/01/2023    MCV 95 11/01/2023    MCHC 32.4 11/01/2023    HGB 13.8 11/01/2023    HCT 42.6 11/01/2023     11/01/2023     No results found for: \"RETICCTPCT\"   Lab Results   Component Value Date    CREATININE 1.12 09/15/2023    BUN 21 09/15/2023     09/15/2023    K 4.5 09/15/2023     09/15/2023    CO2 30 09/15/2023      Lab Results   Component Value Date    ALT 14 06/12/2023    AST 18 06/12/2023    ALKPHOS 62 06/12/2023    BILITOT 0.4 06/12/2023      No results found for: \"TSH\"  No results found for: \"TSH\", \"O4MAKGI\", \"E0XZHKI\", \"THYROIDPAB\"  Lab " Results   Component Value Date    IRON 111 06/19/2023    TIBC 358 06/19/2023    FERRITIN 86 06/19/2023      Lab Results   Component Value Date    [id number] 372 06/19/2023      Lab Results   Component Value Date    FOLATE 14.0 06/19/2023       Assessment:    78 y.o. male referred for management ad evaluation of leucopenia without neutropenia.    Plan:    Reviewed and discussed lab, imaging, and pathology results with patient in detail as well as diagnosis, prognosis, and treatment options.    Will send leucopenia work up (unclear if now normal counts is due to infection or he actually recovered)    F/U w/PCP    Patient verbalized understanding, and all her questions were answered to her satisfaction.      60 min spent with patient greater than 50 % of which was spent in consultation, counselling and coordination of care.

## 2023-11-02 LAB
ANA PATTERN: ABNORMAL
ANA SER QL HEP2 SUBST: POSITIVE
ANA TITR SER IF: ABNORMAL {TITER}
CENTROMERE B AB SER-ACNC: <0.2 AI
CHROMATIN AB SERPL-ACNC: <0.2 AI
DSDNA AB SER-ACNC: <1 IU/ML
ENA JO1 AB SER QL IA: <0.2 AI
ENA RNP AB SER IA-ACNC: 0.3 AI
ENA SCL70 AB SER QL IA: <0.2 AI
ENA SM AB SER IA-ACNC: <0.2 AI
ENA SM+RNP AB SER QL IA: <0.2 AI
ENA SS-A AB SER IA-ACNC: <0.2 AI
ENA SS-B AB SER IA-ACNC: <0.2 AI
FOLATE SERPL-MCNC: >24 NG/ML
HAV AB SER QL IA: REACTIVE
HBV CORE AB SER QL: REACTIVE
HBV SURFACE AB SER-ACNC: 16.8 MIU/ML
HBV SURFACE AG SERPL QL IA: NONREACTIVE
HCV AB SER QL: NONREACTIVE
HIV 1+2 AB+HIV1 P24 AG SERPL QL IA: NONREACTIVE
IGA SERPL-MCNC: 150 MG/DL (ref 70–400)
IGG SERPL-MCNC: 936 MG/DL (ref 700–1600)
IGM SERPL-MCNC: 50 MG/DL (ref 40–230)
KAPPA LC SERPL-MCNC: 2.07 MG/DL (ref 0.33–1.94)
KAPPA LC/LAMBDA SER: 1.38 {RATIO} (ref 0.26–1.65)
LAMBDA LC SERPL-MCNC: 1.5 MG/DL (ref 0.57–2.63)
PROT SERPL-MCNC: 6.7 G/DL (ref 6.4–8.2)
RIBOSOMAL P AB SER-ACNC: <0.2 AI
VIT B12 SERPL-MCNC: 1564 PG/ML (ref 211–911)

## 2023-11-06 LAB
CELL COUNT (BLOOD): 4.5 X10*3/UL
CELL POPULATIONS: NORMAL
CYTOGENETICS/MOLECULAR TEST ORDERED: NORMAL
DIAGNOSIS: NORMAL
FLOW DIFFERENTIAL: NORMAL
FLOW TEST ORDERED: NORMAL
LAB TEST METHOD: NORMAL
NUMBER OF CELLS COLLECTED: NORMAL PER TUBE
PATH REPORT.TOTAL CANCER: NORMAL
RBC MORPH BLD: NORMAL
SIGNATURE COMMENT: NORMAL
SPECIMEN VIABILITY: NORMAL
WBC MORPH BLD: NORMAL

## 2023-11-08 LAB
ALBUMIN: 4.5 G/DL (ref 3.4–5)
ALPHA 1 GLOBULIN: 0.2 G/DL (ref 0.2–0.6)
ALPHA 2 GLOBULIN: 0.5 G/DL (ref 0.4–1.1)
BETA GLOBULIN: 0.6 G/DL (ref 0.5–1.2)
CHROM ANALY OVERALL INTERP-IMP: NORMAL
ELECTRONICALLY SIGNED BY CYTOGENETICS: NORMAL
GAMMA GLOBULIN: 0.8 G/DL (ref 0.5–1.4)
IMMUNOFIXATION COMMENT: NORMAL
PATH REVIEW - SERUM IMMUNOFIXATION: NORMAL
PATH REVIEW-SERUM PROTEIN ELECTROPHORESIS: NORMAL
PROTEIN ELECTROPHORESIS COMMENT: NORMAL

## 2023-11-28 ENCOUNTER — TELEMEDICINE (OUTPATIENT)
Dept: HEMATOLOGY/ONCOLOGY | Facility: CLINIC | Age: 78
End: 2023-11-28
Payer: MEDICARE

## 2023-11-28 DIAGNOSIS — D72.819 LEUKOPENIA, UNSPECIFIED TYPE: Primary | ICD-10-CM

## 2023-11-28 PROCEDURE — 99214 OFFICE O/P EST MOD 30 MIN: CPT | Performed by: PHYSICIAN ASSISTANT

## 2023-11-28 NOTE — PROGRESS NOTES
Reason for Visit  Yinka Wasserman is a 78 y.o. male referred by Dr. Charlene Cordero for management ad evaluation of leucopenia.    PMH/PSH: DMII, HTN, HL, BPH, GERD, arthritis, neck surgery, sciatica.  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      History of Present Illness:  Upon review of labs, noted to have leucopenia w/o neutropenia since 8/2022. Normal H/H and platelet count.    On assessment, reports feeling well with good energy and appetite. Had recent thumb infection. Denies f/c/night sweats, frequent infection.     Review of Systems: All of the systems have been reviewed and are negative for complaints except what is stated in the HPI and/or past medical history.    Allergies and Intolerances:  No Known Allergies           Outpatient Medication Profile:  Current Outpatient Medications   Medication Sig Dispense Refill    azelastine (Astelin) 137 mcg (0.1 %) nasal spray Administer 2 sprays into each nostril 2 times a day.      cetirizine (ZyrTEC) 10 mg tablet Take 1 tablet (10 mg) by mouth once daily.      esomeprazole (NexIUM) 20 mg DR capsule TAKE 1 CAPSULE BY MOUTH TWICE DAILY replace omeprazole 180 capsule 3    esomeprazole (NexIUM) 40 mg DR capsule Take 1 capsule (40 mg) by mouth once daily as needed.      famotidine (Pepcid) 20 mg tablet Take 1 tablet (20 mg) by mouth 2 times a day.      finasteride (Proscar) 5 mg tablet Take 1 tablet (5 mg) by mouth once daily. 90 tablet 3    fluticasone (Flonase) 50 mcg/actuation nasal spray Flonase 50 MCG/ACT SUSP  Refills: 0      hydrALAZINE (Apresoline) 10 mg tablet 1 tab bid 180 tablet 3    lisinopril 10 mg tablet Take 1 tablet (10 mg) by mouth 2 times a day. 90 tablet 3    metFORMIN (Glucophage) 500 mg tablet Take 1 tablet (500 mg) by mouth 2 times a day. 180 tablet 3    omeprazole (PriLOSEC) 20 mg DR capsule Take 1 capsule (20 mg) by mouth once daily.      oxybutynin (Ditropan) 5 mg tablet Take 1 tablet (5 mg) by  mouth 2 times a day.      pravastatin (Pravachol) 40 mg tablet Take 1 tablet (40 mg) by mouth once daily at bedtime. 90 tablet 3    tamsulosin (Flomax) 0.4 mg 24 hr capsule Take 1 capsule (0.4 mg) by mouth 2 times a day. 180 capsule 3    triamcinolone (Kenalog) 0.1 % cream Apply topically 2 times a day. to affected area       No current facility-administered medications for this visit.        Vitals and Measurements:   There were no vitals taken for this visit.       Physical Exam:   Constitutional: alert, awake and oriented, not in acute distress   HEENT: moist mucous membranes, normal nose   Neck: supple, no lymphadenopathy   EYES: PERRL, EOM intact, conjunctiva normal  Skin: no jaundice, rash or erythema  Neurological: AAOx3, no gross focal deficit   Psychiatric: normal mood and behavior     Office Visit on 11/01/2023   Component Date Value Ref Range Status    WBC 11/01/2023 4.5  4.4 - 11.3 x10*3/uL Final    RBC 11/01/2023 4.51  4.50 - 5.90 x10*6/uL Final    Hemoglobin 11/01/2023 13.8  13.5 - 17.5 g/dL Final    Hematocrit 11/01/2023 42.6  41.0 - 52.0 % Final    MCV 11/01/2023 95  80 - 100 fL Final    MCH 11/01/2023 30.6  26.0 - 34.0 pg Final    MCHC 11/01/2023 32.4  32.0 - 36.0 g/dL Final    RDW 11/01/2023 12.7  11.5 - 14.5 % Final    Platelets 11/01/2023 174  150 - 450 x10*3/uL Final    Neutrophils % 11/01/2023 63.1  40.0 - 80.0 % Final    Immature Granulocytes %, Automated 11/01/2023 0.2  0.0 - 0.9 % Final    Immature Granulocyte Count (IG) includes promyelocytes, myelocytes and metamyelocytes but does not include bands. Percent differential counts (%) should be interpreted in the context of the absolute cell counts (cells/UL).    Lymphocytes % 11/01/2023 26.3  13.0 - 44.0 % Final    Monocytes % 11/01/2023 8.9  2.0 - 10.0 % Final    Eosinophils % 11/01/2023 1.3  0.0 - 6.0 % Final    Basophils % 11/01/2023 0.2  0.0 - 2.0 % Final    Neutrophils Absolute 11/01/2023 2.82  1.60 - 5.50 x10*3/uL Final    Percent  differential counts (%) should be interpreted in the context of the absolute cell counts (cells/uL).    Immature Granulocytes Absolute, Au* 11/01/2023 0.01  0.00 - 0.50 x10*3/uL Final    Lymphocytes Absolute 11/01/2023 1.18  0.80 - 3.00 x10*3/uL Final    Monocytes Absolute 11/01/2023 0.40  0.05 - 0.80 x10*3/uL Final    Eosinophils Absolute 11/01/2023 0.06  0.00 - 0.40 x10*3/uL Final    Basophils Absolute 11/01/2023 0.01  0.00 - 0.10 x10*3/uL Final    Glucose 11/01/2023 160 (H)  74 - 99 mg/dL Final    Sodium 11/01/2023 133 (L)  136 - 145 mmol/L Final    Potassium 11/01/2023 4.8  3.5 - 5.3 mmol/L Final    Chloride 11/01/2023 100  98 - 107 mmol/L Final    Bicarbonate 11/01/2023 25  21 - 32 mmol/L Final    Anion Gap 11/01/2023 13  10 - 20 mmol/L Final    Urea Nitrogen 11/01/2023 23  6 - 23 mg/dL Final    Creatinine 11/01/2023 1.14  0.50 - 1.30 mg/dL Final    eGFR 11/01/2023 65  >60 mL/min/1.73m*2 Final    Calculations of estimated GFR are performed using the 2021 CKD-EPI Study Refit equation without the race variable for the IDMS-Traceable creatinine methods.  https://jasn.asnjournals.org/content/early/2021/09/22/ASN.0264740565    Calcium 11/01/2023 9.1  8.6 - 10.3 mg/dL Final    Albumin 11/01/2023 4.3  3.4 - 5.0 g/dL Final    Alkaline Phosphatase 11/01/2023 71  33 - 136 U/L Final    Total Protein 11/01/2023 6.6  6.4 - 8.2 g/dL Final    AST 11/01/2023 23  9 - 39 U/L Final    Bilirubin, Total 11/01/2023 0.3  0.0 - 1.2 mg/dL Final    ALT 11/01/2023 23  10 - 52 U/L Final    Patients treated with Sulfasalazine may generate falsely decreased results for ALT.    Iron 11/01/2023 81  35 - 150 ug/dL Final    UIBC 11/01/2023 320  110 - 370 ug/dL Final    TIBC 11/01/2023 401  240 - 445 ug/dL Final    % Saturation 11/01/2023 20 (L)  25 - 45 % Final    LDH 11/01/2023 156  84 - 246 U/L Final    Ferritin 11/01/2023 20  20 - 300 ng/mL Final    Folate, Serum 11/01/2023 >24.0  >5.0 ng/mL Final    Hepatitis C AB 11/01/2023 Nonreactive   Nonreactive Final    Results from patients taking biotin supplements or receiving high-dose biotin therapy should be interpreted with caution due to possible interference with this test. Providers may contact their local laboratory for further information.    Hepatitis B Surface AG 11/01/2023 Nonreactive  Nonreactive Final    Biotin interference may cause falsely decreased results. Patients taking a Biotin dose of up to 5 mg/day should refrain from taking Biotin for 24 hours before sample collection. Providers may contact their local laboratory for  further information.    Hepatitis B Surface AB 11/01/2023 16.8 (H)  <10.0 mIU/mL Final    Interpretive Criteria:                         <10 mIU/mL    Nonreactive                          >=10 mIU/mL    Reactive     Biotin interference may cause falsely decreased results. Patients taking a Biotin dose of up to 5 mg/day should refrain from taking Biotin for 24 hours before sample collection. Providers may contact their local laboratory for further information.    Hepatitis B Core AB- Total 11/01/2023 Reactive (A)  Nonreactive Final    Hepatitis A  AB-Total 11/01/2023 Reactive (A)  Nonreactive Final    Vitamin B12 11/01/2023 1,564 (H)  211 - 911 pg/mL Final    HIV 1/2 Antigen/Antibody Screen wi* 11/01/2023 Nonreactive  Nonreactive Final    Ig Kappa Free Light Chain 11/01/2023 2.07 (H)  0.33 - 1.94 mg/dL Final    Ig Lambda Free Light Chain 11/01/2023 1.50  0.57 - 2.63 mg/dL Final    Kappa/Lambda Ratio 11/01/2023 1.38  0.26 - 1.65 Final    IgG 11/01/2023 936  700 - 1,600 mg/dL Final    IgA 11/01/2023 150  70 - 400 mg/dL Final    IgM 11/01/2023 50  40 - 230 mg/dL Final    RYAN 11/01/2023 Positive (A)  Negative Final    The Antinuclear Antibody (RYAN) test was performed using  indirect immunofluorescence assay with HEp-2 cells slide.    RYAN Pattern 11/01/2023 Homogeneous   Final    RYAN Titer 11/01/2023 1:160   Final    Total Protein 11/01/2023 6.7  6.4 - 8.2 g/dL Final     Albumin 11/01/2023 4.5  3.4 - 5.0 g/dL Final    Alpha 1 Globulin 11/01/2023 0.2  0.2 - 0.6 g/dL Final    Alpha 2 Globulin 11/01/2023 0.5  0.4 - 1.1 g/dL Final    Beta Globulin 11/01/2023 0.6  0.5 - 1.2 g/dL Final    Gamma 11/01/2023 0.8  0.5 - 1.4 g/dL Final    Protein Electrophoresis Comment 11/01/2023 Normal.      Final    Immunofixation Comment 11/01/2023 No monoclonal protein detected.      Final    Path Review - Serum Protein Electr* 11/01/2023 Reviewed and approved by MARIUSZ PEÑA on 11/8/23 at 9:51 AM.       Final    Path Review - Serum Immunofixation 11/01/2023 Reviewed and approved by MARIUSZ PEÑA on 11/8/23 at 9:51 AM.       Final    Case Report 11/01/2023    Final                    Value:Flow Cytometry                                    Case: [id number]                                   Authorizing Provider:  Yancy Doss PA-C          Collected:           11/01/2023 1343              Ordering Location:     [facility]  Received:            11/01/2023 1343                                     Center                                                                       Pathologist:           Gavin Lang MD PhD                                                       Specimen:    Blood, Venous                                                                              Diagnosis 11/01/2023    Final                    Value:This result contains rich text formatting which cannot be displayed here.    Flow Differential 11/01/2023    Final                    Value:This result contains rich text formatting which cannot be displayed here.    Flow Test Ordered 11/01/2023 Acute Panel  not established Final    Specimen Viability 11/01/2023 Acceptable  not established Final    Cell Count 11/01/2023 4.50  not established x10*3/uL Final    Number of Cells Collected 11/01/2023 100,000.00  not established per tube Final    Methodology 11/01/2023    Final                    Value:This result contains  rich text formatting which cannot be displayed here.    Anti-SM 11/01/2023 <0.2  <1.0 AI Final    < 1.0 = NEGATIVE  >=1.0 = POSITIVE    Anti-RNP 11/01/2023 0.3  <1.0 AI Final    < 1.0 = NEGATIVE  >=1.0 = POSITIVE    Anti-SM/RNP 11/01/2023 <0.2  <1.0 AI Final    < 1.0 = NEGATIVE  >=1.0 = POSITIVE    Anti-SSA 11/01/2023 <0.2  <1.0 AI Final    < 1.0 = NEGATIVE  >=1.0 = POSITIVE    Anti-SSB 11/01/2023 <0.2  <1.0 AI Final    < 1.0 = NEGATIVE  >=1.0 = POSITIVE    Anti-SCL-70 11/01/2023 <0.2  <1.0 AI Final    < 1.0 = NEGATIVE  >=1.0 = POSITIVE    Anti-NAVDEEP-1 IgG 11/01/2023 <0.2  <1.0 AI Final    < 1.0 = NEGATIVE  >=1.0 = POSITIVE    Anti-Chromatin 11/01/2023 <0.2  <1.0 AI Final    < 1.0 = NEGATIVE  >=1.0 = POSITIVE    Anti-Centromere 11/01/2023 <0.2  <1.0 AI Final    < 1.0 = NEGATIVE  >=1.0 = POSITIVE    ANTI-RIBOSOMAL P 11/01/2023 <0.2  <1.0 AI Final    < 1.0 = NEGATIVE  >=1.0 = POSITIVE    Anti-DNA (DS) 11/01/2023 <1.0  <5.0 IU/mL Final    NEGATIVE: <= 4 IU/ML  EQUIVOCAL: 5- 9 IU/ML  POSITIVE: >=10 IU/ML    Cytogenetics Interpretation 11/01/2023    Final                    Value:This result contains rich text formatting which cannot be displayed here.    Electronically signed and reported* 11/01/2023    Final                    Value:This result contains rich text formatting which cannot be displayed here.   Office Visit on 10/25/2023   Component Date Value Ref Range Status    Tissue/Wound Culture/Smear 10/25/2023 No growth aerobically and anaerobically   Final    Gram Stain 10/25/2023 No polymorphonuclear leukocytes seen   Final    Gram Stain 10/25/2023 No organisms seen   Final   Lab on 09/15/2023   Component Date Value Ref Range Status    Glucose 09/15/2023 155 (H)  74 - 99 mg/dL Final    Sodium 09/15/2023 137  136 - 145 mmol/L Final    Potassium 09/15/2023 4.5  3.5 - 5.3 mmol/L Final    Chloride 09/15/2023 101  98 - 107 mmol/L Final    Bicarbonate 09/15/2023 30  21 - 32 mmol/L Final    Anion Gap 09/15/2023 11  10 - 20  mmol/L Final    Urea Nitrogen 09/15/2023 21  6 - 23 mg/dL Final    Creatinine 09/15/2023 1.12  0.50 - 1.30 mg/dL Final    GFR MALE 09/15/2023 67  >90 mL/min/1.73m2 Final     CALCULATIONS OF ESTIMATED GFR ARE PERFORMED   USING THE 2021 CKD-EPI STUDY REFIT EQUATION   WITHOUT THE RACE VARIABLE FOR THE IDMS-TRACEABLE   CREATININE METHODS.    https://jasn.asnjournals.org/content/early/2021/09/22/ASN.8492681438    Calcium 09/15/2023 9.1  8.6 - 10.3 mg/dL Final    WBC 09/15/2023 2.8 (L)  4.4 - 11.3 x10E9/L Final    RBC 09/15/2023 4.68  4.50 - 5.90 x10E12/L Final    Hemoglobin 09/15/2023 14.4  13.5 - 17.5 g/dL Final    Hematocrit 09/15/2023 43.4  41.0 - 52.0 % Final    MCV 09/15/2023 93  80 - 100 fL Final    MCHC 09/15/2023 33.2  32.0 - 36.0 g/dL Final    Platelets 09/15/2023 154  150 - 450 x10E9/L Final    RDW 09/15/2023 12.5  11.5 - 14.5 % Final   Lab on 06/19/2023   Component Date Value Ref Range Status    Vitamin B-12 06/19/2023 372  211 - 911 pg/mL Final    Ferritin 06/19/2023 86  20 - 300 ug/L Final    Folate 06/19/2023 14.0  >5.0 ng/mL Final    Comment: Low           <3.4  Borderline 3.4-5.0  Normal        >5.0  .   Patients receiving more than 5 mg/day of biotin may have interference   in test results. A sample should be taken no sooner than eight hours   after previous dose. Contact the testing laboratory for additional   information.       Iron 06/19/2023 111  35 - 150 ug/dL Final    TIBC 06/19/2023 358  240 - 445 ug/dL Final    Iron Saturation 06/19/2023 31  25 - 45 % Final   Lab on 06/12/2023   Component Date Value Ref Range Status    WBC 06/12/2023 3.3 (L)  4.4 - 11.3 x10E9/L Final    RBC 06/12/2023 4.27 (L)  4.50 - 5.90 x10E12/L Final    Hemoglobin 06/12/2023 13.2 (L)  13.5 - 17.5 g/dL Final    Hematocrit 06/12/2023 39.7 (L)  41.0 - 52.0 % Final    MCV 06/12/2023 93  80 - 100 fL Final    MCHC 06/12/2023 33.2  32.0 - 36.0 g/dL Final    Platelets 06/12/2023 172  150 - 450 x10E9/L Final    RDW 06/12/2023 13.4   11.5 - 14.5 % Final    ALBUMIN (MG/L) IN URINE 06/12/2023 7.6  Not Established mg/L Final    Albumin/Creatine Ratio 06/12/2023 5.1  0.0 - 30.0 ug/mg crt Final    Creatinine, Urine 06/12/2023 150.0  20.0 - 370.0 mg/dL Final    Glucose 06/12/2023 116 (H)  74 - 99 mg/dL Final    Sodium 06/12/2023 133 (L)  136 - 145 mmol/L Final    Potassium 06/12/2023 4.1  3.5 - 5.3 mmol/L Final    Chloride 06/12/2023 97 (L)  98 - 107 mmol/L Final    Bicarbonate 06/12/2023 31  21 - 32 mmol/L Final    Anion Gap 06/12/2023 9 (L)  10 - 20 mmol/L Final    Urea Nitrogen 06/12/2023 18  6 - 23 mg/dL Final    Creatinine 06/12/2023 1.08  0.50 - 1.30 mg/dL Final    GFR MALE 06/12/2023 70  >90 mL/min/1.73m2 Final     CALCULATIONS OF ESTIMATED GFR ARE PERFORMED   USING THE 2021 CKD-EPI STUDY REFIT EQUATION   WITHOUT THE RACE VARIABLE FOR THE IDMS-TRACEABLE   CREATININE METHODS.    https://jasn.asnjournals.org/content/early/2021/09/22/ASN.0532886971    Calcium 06/12/2023 9.5  8.6 - 10.3 mg/dL Final    Albumin 06/12/2023 4.3  3.4 - 5.0 g/dL Final    Alkaline Phosphatase 06/12/2023 62  33 - 136 U/L Final    Total Protein 06/12/2023 6.4  6.4 - 8.2 g/dL Final    AST 06/12/2023 18  9 - 39 U/L Final    Total Bilirubin 06/12/2023 0.4  0.0 - 1.2 mg/dL Final    ALT (SGPT) 06/12/2023 14  10 - 52 U/L Final     Patients treated with Sulfasalazine may generate    falsely decreased results for ALT.    Hemoglobin A1C 06/12/2023 6.1 (A)  % Final         Diagnosis of Diabetes-Adults   Non-Diabetic: < or = 5.6%   Increased risk for developing diabetes: 5.7-6.4%   Diagnostic of diabetes: > or = 6.5%  .       Monitoring of Diabetes                Age (y)     Therapeutic Goal (%)   Adults:          >18           <7.0   Pediatrics:    13-18           <7.5                   7-12           <8.0                   0- 6            7.5-8.5   American Diabetes Association. Diabetes Care 33(S1), Jan 2010.    Estimated Average Glucose 06/12/2023 128  MG/DL Final    Cholesterol  06/12/2023 175  0 - 199 mg/dL Final    .      AGE      DESIRABLE   BORDERLINE HIGH   HIGH     0-19 Y     0 - 169       170 - 199     >/= 200    20-24 Y     0 - 189       190 - 224     >/= 225         >24 Y     0 - 199       200 - 239     >/= 240   **All ranges are based on fasting samples. Specific   therapeutic targets will vary based on patient-specific   cardiac risk.  .   Pediatric guidelines reference:Pediatrics 2011, 128(S5).   Adult guidelines reference: NCEP ATPIII Guidelines,     TONG 2001, 258:2486-97  .   Venipuncture immediately after or during the    administration of Metamizole may lead to falsely   low results. Testing should be performed immediately   prior to Metamizole dosing.    HDL 06/12/2023 62.3  mg/dL Final    .      AGE      VERY LOW   LOW     NORMAL    HIGH       0-19 Y       < 35   < 40     40-45     ----    20-24 Y       ----   < 40       >45     ----      >24 Y       ----   < 40     40-60      >60  .    Cholesterol/HDL Ratio 06/12/2023 2.8   Final    REF VALUES  DESIRABLE  < 3.4  HIGH RISK  > 5.0    LDL 06/12/2023 94  0 - 99 mg/dL Final    .                           NEAR      BORD      AGE      DESIRABLE  OPTIMAL    HIGH     HIGH     VERY HIGH     0-19 Y     0 - 109     ---    110-129   >/= 130     ----    20-24 Y     0 - 119     ---    120-159   >/= 160     ----      >24 Y     0 -  99   100-129  130-159   160-189     >/=190  .    VLDL 06/12/2023 19  0 - 40 mg/dL Final    Triglycerides 06/12/2023 93  0 - 149 mg/dL Final    .      AGE      DESIRABLE   BORDERLINE HIGH   HIGH     VERY HIGH   0 D-90 D    19 - 174         ----         ----        ----  91 D- 9 Y     0 -  74        75 -  99     >/= 100      ----    10-19 Y     0 -  89        90 - 129     >/= 130      ----    20-24 Y     0 - 114       115 - 149     >/= 150      ----         >24 Y     0 - 149       150 - 199    200- 499    >/= 500  .   Venipuncture immediately after or during the    administration of Metamizole may lead to  falsely   low results. Testing should be performed immediately   prior to Metamizole dosing.       Assessment:    78 y.o. male referred for management ad evaluation of leucopenia without neutropenia.    Plan:    Reviewed and discussed lab, imaging, and pathology results with patient in detail as well as diagnosis, prognosis, and treatment options.    Work up revealed positive hep B core antibody, total and Hep B surface antibody; unclear if patient vaccinated, acute or chronic infection. Will complete work up.    Also noted to have iron deficiency w/o anemia; patient w/good diet and had c-scope in 2019, benign. Continue to monitor.     Otherwise work up including PB flow unremarkable and last check WBC normal. May have been due to illness/infection.     F/U w/PCP    Patient verbalized understanding, and all her questions were answered to her satisfaction.     30 min spent with patient greater than 50 % of which was spent in consultation, counselling and coordination of care.

## 2023-12-11 DIAGNOSIS — I10 PRIMARY HYPERTENSION: ICD-10-CM

## 2023-12-11 RX ORDER — LISINOPRIL 10 MG/1
TABLET ORAL
Qty: 90 TABLET | Refills: 3 | Status: SHIPPED | OUTPATIENT
Start: 2023-12-11

## 2023-12-12 ENCOUNTER — OFFICE VISIT (OUTPATIENT)
Dept: PRIMARY CARE | Facility: CLINIC | Age: 78
End: 2023-12-12
Payer: MEDICARE

## 2023-12-12 ENCOUNTER — LAB (OUTPATIENT)
Dept: LAB | Facility: LAB | Age: 78
End: 2023-12-12
Payer: MEDICARE

## 2023-12-12 VITALS — RESPIRATION RATE: 14 BRPM | SYSTOLIC BLOOD PRESSURE: 125 MMHG | HEART RATE: 72 BPM | DIASTOLIC BLOOD PRESSURE: 76 MMHG

## 2023-12-12 DIAGNOSIS — E11.9 TYPE 2 DIABETES MELLITUS WITHOUT COMPLICATION, WITHOUT LONG-TERM CURRENT USE OF INSULIN (MULTI): ICD-10-CM

## 2023-12-12 DIAGNOSIS — I10 PRIMARY HYPERTENSION: Primary | ICD-10-CM

## 2023-12-12 DIAGNOSIS — E87.1 HYPONATREMIA: ICD-10-CM

## 2023-12-12 DIAGNOSIS — I10 PRIMARY HYPERTENSION: ICD-10-CM

## 2023-12-12 DIAGNOSIS — E78.00 PURE HYPERCHOLESTEROLEMIA: ICD-10-CM

## 2023-12-12 DIAGNOSIS — D72.819 LEUKOPENIA, UNSPECIFIED TYPE: ICD-10-CM

## 2023-12-12 LAB
ALBUMIN SERPL BCP-MCNC: 4.8 G/DL (ref 3.4–5)
ALP SERPL-CCNC: 54 U/L (ref 33–136)
ALT SERPL W P-5'-P-CCNC: 18 U/L (ref 10–52)
ANION GAP SERPL CALC-SCNC: 12 MMOL/L (ref 10–20)
AST SERPL W P-5'-P-CCNC: 21 U/L (ref 9–39)
BASOPHILS # BLD AUTO: 0.01 X10*3/UL (ref 0–0.1)
BASOPHILS NFR BLD AUTO: 0.2 %
BILIRUB SERPL-MCNC: 0.4 MG/DL (ref 0–1.2)
BUN SERPL-MCNC: 20 MG/DL (ref 6–23)
CALCIUM SERPL-MCNC: 10.2 MG/DL (ref 8.6–10.3)
CHLORIDE SERPL-SCNC: 95 MMOL/L (ref 98–107)
CHOLEST SERPL-MCNC: 191 MG/DL (ref 0–199)
CHOLESTEROL/HDL RATIO: 3.4
CO2 SERPL-SCNC: 29 MMOL/L (ref 21–32)
CREAT SERPL-MCNC: 1.1 MG/DL (ref 0.5–1.3)
CREAT UR-MCNC: 157.8 MG/DL (ref 20–370)
EOSINOPHIL # BLD AUTO: 0.05 X10*3/UL (ref 0–0.4)
EOSINOPHIL NFR BLD AUTO: 1.2 %
ERYTHROCYTE [DISTWIDTH] IN BLOOD BY AUTOMATED COUNT: 13 % (ref 11.5–14.5)
GFR SERPL CREATININE-BSD FRML MDRD: 68 ML/MIN/1.73M*2
GLUCOSE SERPL-MCNC: 83 MG/DL (ref 74–99)
HCT VFR BLD AUTO: 45.7 % (ref 41–52)
HDLC SERPL-MCNC: 56.8 MG/DL
HGB BLD-MCNC: 15.3 G/DL (ref 13.5–17.5)
IMM GRANULOCYTES # BLD AUTO: 0.02 X10*3/UL (ref 0–0.5)
IMM GRANULOCYTES NFR BLD AUTO: 0.5 % (ref 0–0.9)
LDLC SERPL CALC-MCNC: 100 MG/DL
LYMPHOCYTES # BLD AUTO: 1.56 X10*3/UL (ref 0.8–3)
LYMPHOCYTES NFR BLD AUTO: 38.9 %
MCH RBC QN AUTO: 30.1 PG (ref 26–34)
MCHC RBC AUTO-ENTMCNC: 33.5 G/DL (ref 32–36)
MCV RBC AUTO: 90 FL (ref 80–100)
MICROALBUMIN UR-MCNC: 9.8 MG/L
MICROALBUMIN/CREAT UR: 6.2 UG/MG CREAT
MONOCYTES # BLD AUTO: 0.48 X10*3/UL (ref 0.05–0.8)
MONOCYTES NFR BLD AUTO: 12 %
NEUTROPHILS # BLD AUTO: 1.89 X10*3/UL (ref 1.6–5.5)
NEUTROPHILS NFR BLD AUTO: 47.2 %
NON HDL CHOLESTEROL: 134 MG/DL (ref 0–149)
NRBC BLD-RTO: 0 /100 WBCS (ref 0–0)
PLATELET # BLD AUTO: 168 X10*3/UL (ref 150–450)
POTASSIUM SERPL-SCNC: 5.1 MMOL/L (ref 3.5–5.3)
PROT SERPL-MCNC: 7.1 G/DL (ref 6.4–8.2)
RBC # BLD AUTO: 5.08 X10*6/UL (ref 4.5–5.9)
SODIUM SERPL-SCNC: 131 MMOL/L (ref 136–145)
TRIGL SERPL-MCNC: 169 MG/DL (ref 0–149)
VLDL: 34 MG/DL (ref 0–40)
WBC # BLD AUTO: 4 X10*3/UL (ref 4.4–11.3)

## 2023-12-12 PROCEDURE — 86709 HEPATITIS A IGM ANTIBODY: CPT

## 2023-12-12 PROCEDURE — 90677 PCV20 VACCINE IM: CPT | Performed by: FAMILY MEDICINE

## 2023-12-12 PROCEDURE — 1125F AMNT PAIN NOTED PAIN PRSNT: CPT | Performed by: FAMILY MEDICINE

## 2023-12-12 PROCEDURE — 1036F TOBACCO NON-USER: CPT | Performed by: FAMILY MEDICINE

## 2023-12-12 PROCEDURE — 99214 OFFICE O/P EST MOD 30 MIN: CPT | Performed by: FAMILY MEDICINE

## 2023-12-12 PROCEDURE — 80061 LIPID PANEL: CPT

## 2023-12-12 PROCEDURE — 82043 UR ALBUMIN QUANTITATIVE: CPT

## 2023-12-12 PROCEDURE — 87517 HEPATITIS B DNA QUANT: CPT

## 2023-12-12 PROCEDURE — 3074F SYST BP LT 130 MM HG: CPT | Performed by: FAMILY MEDICINE

## 2023-12-12 PROCEDURE — 83036 HEMOGLOBIN GLYCOSYLATED A1C: CPT

## 2023-12-12 PROCEDURE — 1160F RVW MEDS BY RX/DR IN RCRD: CPT | Performed by: FAMILY MEDICINE

## 2023-12-12 PROCEDURE — 82570 ASSAY OF URINE CREATININE: CPT

## 2023-12-12 PROCEDURE — 3078F DIAST BP <80 MM HG: CPT | Performed by: FAMILY MEDICINE

## 2023-12-12 PROCEDURE — 36415 COLL VENOUS BLD VENIPUNCTURE: CPT

## 2023-12-12 PROCEDURE — 86705 HEP B CORE ANTIBODY IGM: CPT

## 2023-12-12 PROCEDURE — 85025 COMPLETE CBC W/AUTO DIFF WBC: CPT

## 2023-12-12 PROCEDURE — 1159F MED LIST DOCD IN RCRD: CPT | Performed by: FAMILY MEDICINE

## 2023-12-12 PROCEDURE — G0009 ADMIN PNEUMOCOCCAL VACCINE: HCPCS | Performed by: FAMILY MEDICINE

## 2023-12-12 PROCEDURE — 86803 HEPATITIS C AB TEST: CPT

## 2023-12-12 PROCEDURE — 80053 COMPREHEN METABOLIC PANEL: CPT

## 2023-12-12 NOTE — ASSESSMENT & PLAN NOTE
Hyperlipidemia on statin. No GI upset or muscle ache. check labs for evaluation.  Health diet and regular exercise. f/u in 3 mos.

## 2023-12-13 DIAGNOSIS — E78.00 PURE HYPERCHOLESTEROLEMIA: Primary | ICD-10-CM

## 2023-12-13 LAB
EST. AVERAGE GLUCOSE BLD GHB EST-MCNC: 131 MG/DL
HAV IGM SER QL: NONREACTIVE
HBA1C MFR BLD: 6.2 %
HBV CORE IGM SER QL: NONREACTIVE
HCV AB SER QL: NONREACTIVE

## 2023-12-13 RX ORDER — ROSUVASTATIN CALCIUM 40 MG/1
40 TABLET, COATED ORAL DAILY
Qty: 90 TABLET | Refills: 3 | Status: SHIPPED | OUTPATIENT
Start: 2023-12-13

## 2023-12-14 LAB
HBV DNA SERPL NAA+PROBE-ACNC: NOT DETECTED [IU]/ML
HBV DNA SERPL NAA+PROBE-LOG IU: NORMAL {LOG_IU}/ML

## 2024-01-15 ENCOUNTER — OFFICE VISIT (OUTPATIENT)
Dept: PRIMARY CARE | Facility: CLINIC | Age: 79
End: 2024-01-15
Payer: MEDICARE

## 2024-01-15 VITALS — DIASTOLIC BLOOD PRESSURE: 73 MMHG | SYSTOLIC BLOOD PRESSURE: 155 MMHG

## 2024-01-15 DIAGNOSIS — R14.0 ABDOMINAL BLOATING: Primary | ICD-10-CM

## 2024-01-15 PROCEDURE — 1125F AMNT PAIN NOTED PAIN PRSNT: CPT | Performed by: FAMILY MEDICINE

## 2024-01-15 PROCEDURE — 3078F DIAST BP <80 MM HG: CPT | Performed by: FAMILY MEDICINE

## 2024-01-15 PROCEDURE — 3077F SYST BP >= 140 MM HG: CPT | Performed by: FAMILY MEDICINE

## 2024-01-15 PROCEDURE — 99213 OFFICE O/P EST LOW 20 MIN: CPT | Performed by: FAMILY MEDICINE

## 2024-01-15 PROCEDURE — 1036F TOBACCO NON-USER: CPT | Performed by: FAMILY MEDICINE

## 2024-01-15 PROCEDURE — 1159F MED LIST DOCD IN RCRD: CPT | Performed by: FAMILY MEDICINE

## 2024-01-15 NOTE — PROGRESS NOTES
Subjective   Patient ID: Yinka Wasserman is a 78 y.o. male who presents for abd bloating     HPI   Abd bloating for many years got worse lately. Ppi and famotidine did not help much. Patient denies nausea, abdominal cramping, vomiting,  loose stools, wt loss.  No HA, dizziness, imbalance, sob, cp, heart palpitation. pt had normal appetite. Patient was able to keep food and fluid down. Patient had normal urination and bm.    Review of Systems    Objective   /73     Physical Exam  No acute distress. Eyes: PERRLA, EMOI, moist mouth mucosa, No cervical or axillary lymph node tenderness or enlargement. Lungs: CTA b/l, Heart: RRR, capillary refill was less than 2 seconds. Abdomen: soft, mid abd tenderness. No guarding or rebound, Bowel sound: +. Pt walks with a good balance.     Assessment/Plan

## 2024-01-19 NOTE — PROGRESS NOTES
Subjective   Patient ID: Yinka Wasserman is a 78 y.o. male who presents for fu    HPI   Patient has been compliant with taking all  current medications.  No polydipsia, polyuria or significant weight changes. No lower extremities skin lesion. No LE numbness or tingling. No blurry vision. Pt had occ stock upset. Normal appetite. No dark stools. No CP, HA, dizziness, heart palpitation. No claudication or cold LE.  No LE edema. No imbalance or falls. Good mood.     Review of Systems    Objective   /76   Pulse 72   Resp 14     Physical Exam  NAD, well groomed, No sclera icterus. neck: supple, no cervical or axillary lymphadenopathy,  lungs: CTA b/l, heart: RRR, No LE edema, normal pedal pulses, abd: soft, no tenderness, BS+, normal strength and sensation  at bilateral lower extremities. No skin lesions at bilateral feet.  Good balance. CNII-XII were grossly intact, good judgment and memory. No depressed mood.    Assessment/Plan   Problem List Items Addressed This Visit             ICD-10-CM    Type 2 diabetes mellitus without complication, without long-term current use of insulin (CMS/[facility]) E11.9     DMII, well controlled. Continue current medications. Check A1C, urine albumin. advise eye exam by an OD yearly and checking bilateral feet for skin lesions qhs. Healthy diet and regular exercise.          Relevant Orders    Comprehensive Metabolic Panel    Hemoglobin A1C    Albumin , Urine Random    Pure hypercholesterolemia E78.00     Hyperlipidemia on statin. No GI upset or muscle ache. check labs for evaluation.  Health diet and regular exercise. f/u in 3 mos.            Relevant Orders    Lipid Panel    Comprehensive Metabolic Panel    Primary hypertension - Primary I10     BP has been controlled. Continue BP pills. DASH diet and regular exercise.          Relevant Orders    Comprehensive Metabolic Panel    Hyponatremia E87.1     Limit fluid intake to 1500cc  a day. Will monitor Na                [No Acute Distress] : no acute distress [Normal] : normal rate, regular rhythm, normal S1 and S2 and no murmur heard [No Edema] : there was no peripheral edema [Soft] : abdomen soft [Non Tender] : non-tender [Normal Anterior Cervical Nodes] : no anterior cervical lymphadenopathy [No Focal Deficits] : no focal deficits [Alert and Oriented x3] : oriented to person, place, and time

## 2024-02-22 ENCOUNTER — OFFICE VISIT (OUTPATIENT)
Dept: GASTROENTEROLOGY | Facility: CLINIC | Age: 79
End: 2024-02-22
Payer: MEDICARE

## 2024-02-22 VITALS — WEIGHT: 149 LBS | HEART RATE: 81 BPM | BODY MASS INDEX: 23.39 KG/M2 | HEIGHT: 67 IN

## 2024-02-22 DIAGNOSIS — R19.5 LOOSE STOOLS: ICD-10-CM

## 2024-02-22 DIAGNOSIS — K58.0 IRRITABLE BOWEL SYNDROME WITH DIARRHEA: Primary | ICD-10-CM

## 2024-02-22 DIAGNOSIS — R14.0 ABDOMINAL BLOATING: ICD-10-CM

## 2024-02-22 PROCEDURE — 1159F MED LIST DOCD IN RCRD: CPT | Performed by: INTERNAL MEDICINE

## 2024-02-22 PROCEDURE — 1125F AMNT PAIN NOTED PAIN PRSNT: CPT | Performed by: INTERNAL MEDICINE

## 2024-02-22 PROCEDURE — 99204 OFFICE O/P NEW MOD 45 MIN: CPT | Performed by: INTERNAL MEDICINE

## 2024-02-22 PROCEDURE — 1036F TOBACCO NON-USER: CPT | Performed by: INTERNAL MEDICINE

## 2024-02-22 RX ORDER — DICYCLOMINE HYDROCHLORIDE 20 MG/1
20 TABLET ORAL 4 TIMES DAILY PRN
Qty: 120 TABLET | Refills: 3 | Status: SHIPPED | OUTPATIENT
Start: 2024-02-22 | End: 2024-05-22 | Stop reason: SDUPTHER

## 2024-02-22 ASSESSMENT — ENCOUNTER SYMPTOMS: SHORTNESS OF BREATH: 0

## 2024-02-22 NOTE — PATIENT INSTRUCTIONS
Check hydrogen breath test to evaluate for small intestinal bacterial overgrowth. Trial of Metamucil 1 heaping tablespoonful mixed with 8 ounces of juice or water once daily. Use Dicyclomine 20 mg taken up to 4 times per day as needed for abdominal bloating. Follow-up in the office in 3 months.

## 2024-02-22 NOTE — PROGRESS NOTES
REASON FOR VISIT:  Bloating   PCP (requesting provider): Charlene Cordero MD PhD.    HPI:  Yinka Wasserman is a 78 y.o. male with a past medical history of HTN, HLD, and DM being evaluated for abdominal bloating. EGD normal (4/2021). Colonoscopy showed single TA and hemorrhoids (4/2021).    Patient and son report patient has indigestion and bloating intermittently for multiple years. He has some loose stools. He also has occasional acid reflux. Symptoms seem to worsen in winter time more so than the summer time. He will feel better after passing gas but now sometimes feels like it gets stuck. He has a BM once daily or every 2 days. No blood in the stool. He had EGD and colonoscopy as noted below. He takes Pepto-Bismol with some improvement and this makes his stool darker. He has not tried a fiber supplement in a long time. If he watches his diet and eats earlier in the day, then reflux under good control. He takes Nexium. No NSAIDs.    PSurgHx: No abdominal surgeries     FamHx: No GI cancer     Prior Endoscopy:  -Colonoscopy (4/2021): Good prep, 10 mm transverse polyp (TA), grade II IH, otherwise normal colon.  -EGD (4/2021): Normal esophagus (normal esophageal biopsies), normal stomach (biopsies showed mild chronic inflammation with intestinal metaplasia, H. Pylori -), normal duodenum.    PAST MEDICAL HISTORY  No past medical history on file.    PAST SURGICAL HISTORY  No past surgical history on file.    FAMILY HISTORY  Family History   Problem Relation Name Age of Onset    No Known Problems Mother      Allergies Father         SOCIAL HISTORY   reports that he quit smoking about 41 years ago. His smoking use included cigarettes. He has a 4.00 pack-year smoking history. He has never used smokeless tobacco. He reports that he does not currently use alcohol. He reports that he does not use drugs.    REVIEW OF SYSTEMS  Review of Systems   Respiratory:  Negative for shortness of breath.    Cardiovascular:  Negative for  chest pain.   All other systems reviewed and are negative.    A 10+ point review of systems was otherwise negative except as noted and per HPI.    ALLERGIES  No Known Allergies    MEDICATIONS  Current Outpatient Medications   Medication Instructions    azelastine (Astelin) 137 mcg (0.1 %) nasal spray 2 sprays, Each Nostril, 2 times daily    cetirizine (ZYRTEC) 10 mg, oral, Daily    esomeprazole (NexIUM) 40 mg DR capsule 1 capsule, oral, Daily PRN    famotidine (PEPCID) 20 mg, oral, 2 times daily    finasteride (PROSCAR) 5 mg, oral, Daily    fluticasone (Flonase) 50 mcg/actuation nasal spray Flonase 50 MCG/ACT SUSP  Refills: 0    hydrALAZINE (Apresoline) 10 mg tablet 1 tab bid    lisinopril 10 mg tablet TAKE 1 TABLET (10mg) BY MOUTH TWICE DAILY    metFORMIN (GLUCOPHAGE) 500 mg, oral, 2 times daily    oxybutynin (Ditropan) 5 mg tablet 1 tablet, oral, 2 times daily    rosuvastatin (CRESTOR) 40 mg, oral, Daily    tamsulosin (FLOMAX) 0.4 mg, oral, 2 times daily    triamcinolone (Kenalog) 0.1 % cream Topical, 2 times daily, to affected area       VITALS  There were no vitals filed for this visit.   There is no height or weight on file to calculate BMI.    PHYSICAL EXAM  CONSTITUTIONAL: NAD, appears stated age  EYES: anicteric sclera, sclera clear  HEAD: normocephalic, atraumatic   NECK: supple   PULMONARY: CTAB  CARDIOVASCULAR: RRR, no M/R/G appreciated   ABDOMEN: soft, NTND, +BS, no rebound or guarding   MUSCULOSKELETAL: no edema  SKIN: no jaundice   PSYCHIATRIC: AOx3, appropriate insight and judgement    LABS  WBC   Date Value   12/12/2023 4.0 x10*3/uL (L)   11/01/2023 4.5 x10*3/uL   09/15/2023 2.8 x10E9/L (L)   06/12/2023 3.3 x10E9/L (L)   12/15/2022 4.1 x10E9/L (L)     Hemoglobin (g/dL)   Date Value   12/12/2023 15.3   11/01/2023 13.8   09/15/2023 14.4   06/12/2023 13.2 (L)   12/15/2022 14.8     Platelets   Date Value   12/12/2023 168 x10*3/uL   11/01/2023 174 x10*3/uL   09/15/2023 154 x10E9/L   06/12/2023 172 x10E9/L  "  12/15/2022 184 x10E9/L     Sodium (mmol/L)   Date Value   12/12/2023 131 (L)   11/01/2023 133 (L)   09/15/2023 137   06/12/2023 133 (L)   12/15/2022 130 (L)     Potassium (mmol/L)   Date Value   12/12/2023 5.1   11/01/2023 4.8   09/15/2023 4.5   06/12/2023 4.1   12/15/2022 4.5     Chloride (mmol/L)   Date Value   12/12/2023 95 (L)   11/01/2023 100   09/15/2023 101   06/12/2023 97 (L)   12/15/2022 94 (L)     Bicarbonate (mmol/L)   Date Value   12/12/2023 29   11/01/2023 25   09/15/2023 30   06/12/2023 31   12/15/2022 29     Urea Nitrogen (mg/dL)   Date Value   12/12/2023 20   11/01/2023 23   09/15/2023 21   06/12/2023 18   12/15/2022 17     Creatinine (mg/dL)   Date Value   12/12/2023 1.10   11/01/2023 1.14   09/15/2023 1.12   06/12/2023 1.08   12/15/2022 1.08     Calcium (mg/dL)   Date Value   12/12/2023 10.2   11/01/2023 9.1   09/15/2023 9.1   06/12/2023 9.5   12/15/2022 8.8     Total Protein (g/dL)   Date Value   12/12/2023 7.1   11/01/2023 6.6   11/01/2023 6.7   06/12/2023 6.4   12/15/2022 6.8   08/23/2022 6.6     Bilirubin, Total (mg/dL)   Date Value   12/12/2023 0.4   11/01/2023 0.3     Total Bilirubin (mg/dL)   Date Value   06/12/2023 0.4   12/15/2022 0.4   08/23/2022 0.5     Alkaline Phosphatase (U/L)   Date Value   12/12/2023 54   11/01/2023 71   06/12/2023 62   12/15/2022 61   08/23/2022 60     ALT (U/L)   Date Value   12/12/2023 18   11/01/2023 23     ALT (SGPT) (U/L)   Date Value   06/12/2023 14   12/15/2022 18   08/23/2022 23     AST (U/L)   Date Value   12/12/2023 21   11/01/2023 23   06/12/2023 18   12/15/2022 17   08/23/2022 24     Glucose (mg/dL)   Date Value   12/12/2023 83   11/01/2023 160 (H)   09/15/2023 155 (H)   06/12/2023 116 (H)   12/15/2022 91     No results found for: \"LIPASE\", \"CRP\"    ASSESSMENT/PLAN  Yinka Wasserman is a 78 y.o. male with a past medical history of HTN, HLD, and DM being evaluated for abdominal bloating. EGD normal (4/2021). Colonoscopy showed single TA and hemorrhoids " (4/2021). Patient with longstanding abdominal bloating and erratic stools sometimes loose and sometimes constipation. Symptoms sound typical of IBS. Other differential includes SIBO, EPI, and celiac. Will check for SIBO and trial symptomatic management.    -Check hydrogen breath test to evaluate for SIBO  -Start Metamucil once daily  -Trial of Dicyclomine 20 mg QID PRN   -If no improvement with the above, then consider stool testing for EPI or celiac panel    Follow-up in the office in 3 months.    Signature: Gonzalez Chaudhry MD

## 2024-03-14 ENCOUNTER — OFFICE VISIT (OUTPATIENT)
Dept: PRIMARY CARE | Facility: CLINIC | Age: 79
End: 2024-03-14
Payer: MEDICARE

## 2024-03-14 VITALS
RESPIRATION RATE: 14 BRPM | WEIGHT: 145 LBS | HEART RATE: 78 BPM | DIASTOLIC BLOOD PRESSURE: 68 MMHG | HEIGHT: 68 IN | SYSTOLIC BLOOD PRESSURE: 127 MMHG | BODY MASS INDEX: 21.98 KG/M2

## 2024-03-14 DIAGNOSIS — Z00.00 ROUTINE MEDICAL EXAM: ICD-10-CM

## 2024-03-14 DIAGNOSIS — E11.9 TYPE 2 DIABETES MELLITUS WITHOUT COMPLICATION, WITHOUT LONG-TERM CURRENT USE OF INSULIN (MULTI): ICD-10-CM

## 2024-03-14 DIAGNOSIS — E78.00 PURE HYPERCHOLESTEROLEMIA: ICD-10-CM

## 2024-03-14 DIAGNOSIS — I10 PRIMARY HYPERTENSION: Primary | ICD-10-CM

## 2024-03-14 PROBLEM — R07.89 OTHER CHEST PAIN: Status: RESOLVED | Noted: 2023-09-23 | Resolved: 2024-03-14

## 2024-03-14 PROCEDURE — 99214 OFFICE O/P EST MOD 30 MIN: CPT | Performed by: FAMILY MEDICINE

## 2024-03-14 PROCEDURE — 3078F DIAST BP <80 MM HG: CPT | Performed by: FAMILY MEDICINE

## 2024-03-14 PROCEDURE — G0439 PPPS, SUBSEQ VISIT: HCPCS | Performed by: FAMILY MEDICINE

## 2024-03-14 PROCEDURE — 1036F TOBACCO NON-USER: CPT | Performed by: FAMILY MEDICINE

## 2024-03-14 PROCEDURE — 1159F MED LIST DOCD IN RCRD: CPT | Performed by: FAMILY MEDICINE

## 2024-03-14 PROCEDURE — 1124F ACP DISCUSS-NO DSCNMKR DOCD: CPT | Performed by: FAMILY MEDICINE

## 2024-03-14 PROCEDURE — 1160F RVW MEDS BY RX/DR IN RCRD: CPT | Performed by: FAMILY MEDICINE

## 2024-03-14 PROCEDURE — 1170F FXNL STATUS ASSESSED: CPT | Performed by: FAMILY MEDICINE

## 2024-03-14 PROCEDURE — 3074F SYST BP LT 130 MM HG: CPT | Performed by: FAMILY MEDICINE

## 2024-03-14 ASSESSMENT — ACTIVITIES OF DAILY LIVING (ADL)
TAKING_MEDICATION: INDEPENDENT
MANAGING_FINANCES: INDEPENDENT
DRESSING: INDEPENDENT
BATHING: INDEPENDENT
DRESSING: INDEPENDENT
BATHING: INDEPENDENT
DOING_HOUSEWORK: INDEPENDENT
GROCERY_SHOPPING: INDEPENDENT

## 2024-03-14 ASSESSMENT — PATIENT HEALTH QUESTIONNAIRE - PHQ9
1. LITTLE INTEREST OR PLEASURE IN DOING THINGS: NOT AT ALL
SUM OF ALL RESPONSES TO PHQ9 QUESTIONS 1 AND 2: 0
2. FEELING DOWN, DEPRESSED OR HOPELESS: NOT AT ALL

## 2024-03-14 NOTE — ASSESSMENT & PLAN NOTE
Hyperlipidemia on statin. No GI upset or muscle ache. check labs for evaluation.  Health diet and regular exercise.  f/u in 6 mos.

## 2024-03-14 NOTE — PROGRESS NOTES
"Subjective   Patient ID: Yinka Wasserman is a 78 y.o. male who presents for Medicare Annual Wellness Visit Subsequent (fu).    HPI   Patient has been compliant with taking all  current medications. FBG has been at 120's most days. No hypoglycemic episodes. No polydipsia, polyuria or significant weight changes. No lower extremities skin lesion. No LE numbness or tingling. No blurry vision. No GI upset  or muscle ache while on statin for high lipids. Normal appetite. No CP, HA, dizziness, heart palpitation. No claudication or cold LE.  No LE edema. No imbalance or falls. Good mood. Stable urination    Review of Systems    Objective   /68   Pulse 78   Resp 14   Ht 1.72 m (5' 7.72\")   Wt 65.8 kg (145 lb)   BMI 22.23 kg/m²     Physical Exam  NAD, well groomed, No sclera icterus. neck: supple, no cervical or axillary lymphadenopathy,  lungs: CTA b/l, heart: RRR, No LE edema, normal pedal pulses, abd: soft, no tenderness, BS+, normal strength and sensation  at bilateral lower extremities. No skin lesions at bilateral feet.  Good balance. CNII-XII were grossly intact, good judgment and memory. No depressed mood.    Assessment/Plan   Problem List Items Addressed This Visit             ICD-10-CM    Routine medical exam Z00.00    Type 2 diabetes mellitus without complication, without long-term current use of insulin (CMS/[facility]) E11.9    Relevant Orders    Albumin , Urine Random    Comprehensive Metabolic Panel    Hemoglobin A1C    Pure hypercholesterolemia E78.00    Relevant Orders    Lipid Panel    Primary hypertension - Primary I10    Relevant Orders    CBC    Comprehensive Metabolic Panel        Medicare wellness visit: pt was capable of performing all his ADLs and IADLs. Pt has good memory and cognitive function. Recommend healthy diet and regular exercise. Advise eye exam by an OD yearly for glaucoma screen and dental exam every 6 months. check lipids.   will monitor blood pressure, cholesterol levels and weight " regularly. Recommend shingle vaccines.  recommend grab bar at bathtub.   recommend to update living will and DPOA  pt stated that he had been taking all current  medications as prescribed.

## 2024-03-15 ENCOUNTER — LAB (OUTPATIENT)
Dept: LAB | Facility: LAB | Age: 79
End: 2024-03-15
Payer: MEDICARE

## 2024-03-15 DIAGNOSIS — E78.00 PURE HYPERCHOLESTEROLEMIA: ICD-10-CM

## 2024-03-15 DIAGNOSIS — E11.9 TYPE 2 DIABETES MELLITUS WITHOUT COMPLICATION, WITHOUT LONG-TERM CURRENT USE OF INSULIN (MULTI): ICD-10-CM

## 2024-03-15 DIAGNOSIS — I10 PRIMARY HYPERTENSION: ICD-10-CM

## 2024-03-15 LAB
ALBUMIN SERPL BCP-MCNC: 4.4 G/DL (ref 3.4–5)
ALP SERPL-CCNC: 56 U/L (ref 33–136)
ALT SERPL W P-5'-P-CCNC: 22 U/L (ref 10–52)
ANION GAP SERPL CALC-SCNC: 9 MMOL/L (ref 10–20)
AST SERPL W P-5'-P-CCNC: 24 U/L (ref 9–39)
BILIRUB SERPL-MCNC: 0.4 MG/DL (ref 0–1.2)
BUN SERPL-MCNC: 18 MG/DL (ref 6–23)
CALCIUM SERPL-MCNC: 9.4 MG/DL (ref 8.6–10.3)
CHLORIDE SERPL-SCNC: 102 MMOL/L (ref 98–107)
CHOLEST SERPL-MCNC: 134 MG/DL (ref 0–199)
CHOLESTEROL/HDL RATIO: 2.2
CO2 SERPL-SCNC: 30 MMOL/L (ref 21–32)
CREAT SERPL-MCNC: 1.12 MG/DL (ref 0.5–1.3)
CREAT UR-MCNC: 80.9 MG/DL (ref 20–370)
EGFRCR SERPLBLD CKD-EPI 2021: 67 ML/MIN/1.73M*2
ERYTHROCYTE [DISTWIDTH] IN BLOOD BY AUTOMATED COUNT: 13 % (ref 11.5–14.5)
EST. AVERAGE GLUCOSE BLD GHB EST-MCNC: 154 MG/DL
GLUCOSE SERPL-MCNC: 114 MG/DL (ref 74–99)
HBA1C MFR BLD: 7 %
HCT VFR BLD AUTO: 44 % (ref 41–52)
HDLC SERPL-MCNC: 60.8 MG/DL
HGB BLD-MCNC: 14.8 G/DL (ref 13.5–17.5)
LDLC SERPL CALC-MCNC: 58 MG/DL
MCH RBC QN AUTO: 30.7 PG (ref 26–34)
MCHC RBC AUTO-ENTMCNC: 33.6 G/DL (ref 32–36)
MCV RBC AUTO: 91 FL (ref 80–100)
MICROALBUMIN UR-MCNC: <7 MG/L
MICROALBUMIN/CREAT UR: NORMAL MG/G{CREAT}
NON HDL CHOLESTEROL: 73 MG/DL (ref 0–149)
NRBC BLD-RTO: 0 /100 WBCS (ref 0–0)
PLATELET # BLD AUTO: 162 X10*3/UL (ref 150–450)
POTASSIUM SERPL-SCNC: 4.5 MMOL/L (ref 3.5–5.3)
PROT SERPL-MCNC: 6.8 G/DL (ref 6.4–8.2)
RBC # BLD AUTO: 4.82 X10*6/UL (ref 4.5–5.9)
SODIUM SERPL-SCNC: 136 MMOL/L (ref 136–145)
TRIGL SERPL-MCNC: 74 MG/DL (ref 0–149)
VLDL: 15 MG/DL (ref 0–40)
WBC # BLD AUTO: 3.6 X10*3/UL (ref 4.4–11.3)

## 2024-03-15 PROCEDURE — 36415 COLL VENOUS BLD VENIPUNCTURE: CPT

## 2024-03-15 PROCEDURE — 83036 HEMOGLOBIN GLYCOSYLATED A1C: CPT

## 2024-03-15 PROCEDURE — 80053 COMPREHEN METABOLIC PANEL: CPT

## 2024-03-15 PROCEDURE — 80061 LIPID PANEL: CPT

## 2024-03-15 PROCEDURE — 82570 ASSAY OF URINE CREATININE: CPT

## 2024-03-15 PROCEDURE — 85027 COMPLETE CBC AUTOMATED: CPT

## 2024-03-15 PROCEDURE — 82043 UR ALBUMIN QUANTITATIVE: CPT

## 2024-03-26 DIAGNOSIS — E78.00 PURE HYPERCHOLESTEROLEMIA: ICD-10-CM

## 2024-03-26 RX ORDER — PRAVASTATIN SODIUM 40 MG/1
40 TABLET ORAL NIGHTLY
Qty: 90 TABLET | Refills: 3 | Status: SHIPPED | OUTPATIENT
Start: 2024-03-26

## 2024-03-27 ENCOUNTER — TELEPHONE (OUTPATIENT)
Dept: PRIMARY CARE | Facility: CLINIC | Age: 79
End: 2024-03-27
Payer: MEDICARE

## 2024-03-27 NOTE — TELEPHONE ENCOUNTER
Wanted to clarify which Rx   pravastatin (Pravachol) 40 mg tablet or rosuvastatin (Crestor) 40 mg tablet   The patient should be on. Pt's son stated he was not aware of any changes in RX.

## 2024-04-08 ENCOUNTER — TELEPHONE (OUTPATIENT)
Dept: PRIMARY CARE | Facility: CLINIC | Age: 79
End: 2024-04-08
Payer: MEDICARE

## 2024-04-08 ENCOUNTER — PROCEDURE VISIT (OUTPATIENT)
Dept: GASTROENTEROLOGY | Facility: CLINIC | Age: 79
End: 2024-04-08
Payer: MEDICARE

## 2024-04-08 DIAGNOSIS — K58.0 IRRITABLE BOWEL SYNDROME WITH DIARRHEA: ICD-10-CM

## 2024-04-08 DIAGNOSIS — R19.5 LOOSE STOOLS: ICD-10-CM

## 2024-04-08 DIAGNOSIS — R14.0 ABDOMINAL BLOATING: ICD-10-CM

## 2024-04-08 PROCEDURE — 91065 BREATH HYDROGEN/METHANE TEST: CPT | Performed by: NURSE PRACTITIONER

## 2024-04-08 NOTE — PROGRESS NOTES
Patient ID: Yinka Wasserman is a 79 y.o. male.    Breath Hydrogen Test-Dextrose    Date/Time: 4/8/2024 10:08 AM    Performed by: Maryuri Hubbard RN  Authorized by: Gonzalez Chaudhry MD    Universal protocol:     Patient identity confirmed:  Verbally with patient  Post-procedure details:     Procedure completion:  Tolerated  Hydrogen Breath Analysis Consultation Sheet    Referring Provider: Gonzalez Chaudhry MD  [address] [facility], [facility] [address]  [place] [id number]    Indication: Abdominal bloating, IBS w/diarrhea    Age: 79 y.o.  Weight: There were no vitals filed for this visit.  Substrate: 75 GRAMS DEXTROSE    Last Meal: 1800  Recent Antibiotics: Denies    RESULTS:   Time PPM (H2) APPM* (CH4) CO2 Correction   Baseline #1 0755 2 2 5.3 1.03   Baseline #2 0800 1 3 5.4 1.01   *Challenge Dose Sugar: 0805  15' 0820 2 2 5.0 1.10   30' 0835 2 2 5.5 1.00   45' 0850 2 2 5.3 1.03   60' 0905 3 3 5.2 1.05   75' 0920 3 3 6.4 0.86   90' 0935 4 3 5.4 1.01   105' 0950 3 3 6.3 0.88   120'        135'        150'        165'        180'          Impression: Negative for SIBO and methane

## 2024-05-22 ENCOUNTER — OFFICE VISIT (OUTPATIENT)
Dept: GASTROENTEROLOGY | Facility: CLINIC | Age: 79
End: 2024-05-22
Payer: MEDICARE

## 2024-05-22 VITALS — BODY MASS INDEX: 22.58 KG/M2 | WEIGHT: 149 LBS | HEIGHT: 68 IN | HEART RATE: 77 BPM

## 2024-05-22 DIAGNOSIS — R19.5 LOOSE STOOLS: ICD-10-CM

## 2024-05-22 DIAGNOSIS — R14.0 ABDOMINAL BLOATING: ICD-10-CM

## 2024-05-22 DIAGNOSIS — K58.0 IRRITABLE BOWEL SYNDROME WITH DIARRHEA: ICD-10-CM

## 2024-05-22 PROCEDURE — 99213 OFFICE O/P EST LOW 20 MIN: CPT | Performed by: INTERNAL MEDICINE

## 2024-05-22 PROCEDURE — 1160F RVW MEDS BY RX/DR IN RCRD: CPT | Performed by: INTERNAL MEDICINE

## 2024-05-22 PROCEDURE — 1159F MED LIST DOCD IN RCRD: CPT | Performed by: INTERNAL MEDICINE

## 2024-05-22 RX ORDER — DICYCLOMINE HYDROCHLORIDE 20 MG/1
20 TABLET ORAL 4 TIMES DAILY PRN
Qty: 120 TABLET | Refills: 11 | Status: SHIPPED | OUTPATIENT
Start: 2024-05-22

## 2024-05-22 ASSESSMENT — ENCOUNTER SYMPTOMS: SHORTNESS OF BREATH: 0

## 2024-05-22 NOTE — PROGRESS NOTES
REASON FOR VISIT:  Bloating   PCP (requesting provider): Charlene Cordero MD PhD.    HPI:  Yinka Wasserman is a 79 y.o. male with a past medical history of HTN, HLD, and DM being evaluated for abdominal bloating and alternating diarrhea and constipation. EGD normal (4/2021). Colonoscopy showed single TA and hemorrhoids (4/2021). Hydrogen breath test negative (4/2024). Recommended Metamucil and Dicyclomine at last visit.     He is using Metamucil daily and Dicyclomine daily as well. He is having a formed BM every day without blood. Bloating is also better. He is doing much better per patient and his son. He is taking the Dicyclomine 3-4 times per day. Denies dysphagia or odynophagia. No NSAIDs. He takes Tylenol occasionally. Weight is stable. He is taking Metamucil 1 teaspoonful once daily.    PSurgHx:  -Cervical spine surgery    FamHx: No GI cancer     Prior Endoscopy:  -Colonoscopy (4/2021): Good prep, 10 mm transverse polyp (TA), grade II IH, otherwise normal colon.  -EGD (4/2021): Normal esophagus (normal esophageal biopsies), normal stomach (biopsies showed mild chronic inflammation with intestinal metaplasia, H. Pylori -), normal duodenum.    PAST MEDICAL HISTORY  No past medical history on file.    PAST SURGICAL HISTORY  No past surgical history on file.    FAMILY HISTORY  Family History   Problem Relation Name Age of Onset    No Known Problems Mother      Allergies Father         SOCIAL HISTORY   reports that he has never smoked. He has never used smokeless tobacco. He reports that he does not currently use alcohol. He reports that he does not use drugs.    REVIEW OF SYSTEMS  Review of Systems   Respiratory:  Negative for shortness of breath.    Cardiovascular:  Negative for chest pain.   All other systems reviewed and are negative.    A 10+ point review of systems was otherwise negative except as noted and per HPI.    ALLERGIES  No Known Allergies    MEDICATIONS  Current Outpatient Medications   Medication  Instructions    azelastine (Astelin) 137 mcg (0.1 %) nasal spray 2 sprays, Each Nostril, 2 times daily    cetirizine (ZYRTEC) 10 mg, oral, Daily    dicyclomine (BENTYL) 20 mg, oral, 4 times daily PRN, Do not take immediately prior to driving as this medication may cause drowsiness.    esomeprazole (NexIUM) 40 mg DR capsule 1 capsule, oral, Daily PRN    famotidine (PEPCID) 20 mg, oral, 2 times daily    finasteride (PROSCAR) 5 mg, oral, Daily    fluticasone (Flonase) 50 mcg/actuation nasal spray Flonase 50 MCG/ACT SUSP  Refills: 0    hydrALAZINE (Apresoline) 10 mg tablet 1 tab bid    lisinopril 10 mg tablet TAKE 1 TABLET (10mg) BY MOUTH TWICE DAILY    metFORMIN (GLUCOPHAGE) 500 mg, oral, 2 times daily    oxybutynin (Ditropan) 5 mg tablet 1 tablet, oral, 2 times daily    pravastatin (PRAVACHOL) 40 mg, oral, Nightly    rosuvastatin (CRESTOR) 40 mg, oral, Daily    tamsulosin (FLOMAX) 0.4 mg, oral, 2 times daily    triamcinolone (Kenalog) 0.1 % cream Topical, 2 times daily, to affected area       VITALS  There were no vitals filed for this visit.   There is no height or weight on file to calculate BMI.    PHYSICAL EXAM  CONSTITUTIONAL: NAD, appears stated age  EYES: anicteric sclera, sclera clear  HEAD: normocephalic, atraumatic   NECK: supple   PULMONARY: CTAB  CARDIOVASCULAR: RRR, no M/R/G appreciated   ABDOMEN: soft, NTND, +BS, no rebound or guarding   MUSCULOSKELETAL: no edema  SKIN: no jaundice   PSYCHIATRIC: AOx3, appropriate insight and judgement    LABS  WBC (x10*3/uL)   Date Value   03/15/2024 3.6 (L)   12/12/2023 4.0 (L)   11/01/2023 4.5     Hemoglobin (g/dL)   Date Value   03/15/2024 14.8   12/12/2023 15.3   11/01/2023 13.8     Platelets (x10*3/uL)   Date Value   03/15/2024 162   12/12/2023 168   11/01/2023 174     Sodium (mmol/L)   Date Value   03/15/2024 136   12/12/2023 131 (L)   11/01/2023 133 (L)     Potassium (mmol/L)   Date Value   03/15/2024 4.5   12/12/2023 5.1   11/01/2023 4.8     Chloride (mmol/L)  "  Date Value   03/15/2024 102   12/12/2023 95 (L)   11/01/2023 100     Bicarbonate (mmol/L)   Date Value   03/15/2024 30   12/12/2023 29   11/01/2023 25     Urea Nitrogen (mg/dL)   Date Value   03/15/2024 18   12/12/2023 20   11/01/2023 23     Creatinine (mg/dL)   Date Value   03/15/2024 1.12   12/12/2023 1.10   11/01/2023 1.14     Calcium (mg/dL)   Date Value   03/15/2024 9.4   12/12/2023 10.2   11/01/2023 9.1     Total Protein (g/dL)   Date Value   03/15/2024 6.8   12/12/2023 7.1   11/01/2023 6.6   11/01/2023 6.7     Bilirubin, Total (mg/dL)   Date Value   03/15/2024 0.4   12/12/2023 0.4   11/01/2023 0.3     Alkaline Phosphatase (U/L)   Date Value   03/15/2024 56   12/12/2023 54   11/01/2023 71     ALT (U/L)   Date Value   03/15/2024 22   12/12/2023 18   11/01/2023 23     AST (U/L)   Date Value   03/15/2024 24   12/12/2023 21   11/01/2023 23     Glucose (mg/dL)   Date Value   03/15/2024 114 (H)   12/12/2023 83   11/01/2023 160 (H)     No results found for: \"LIPASE\", \"CRP\"    ASSESSMENT/PLAN  Yinka Wasserman is a 79 y.o. male with a past medical history of HTN, HLD, and DM being evaluated for abdominal bloating, alternating diarrhea and constipation, and personal history of adenomatous colon polyps. Symptoms typical of IBS-D. EGD normal (4/2021). Colonoscopy showed single TA and hemorrhoids (4/2021). Hydrogen breath test negative (4/2024). Significant improvement in symptoms with daily Metamucil and Dicyclomine. He is now doing well.    -Continue Dicyclomine 20 mg QID PRN  -Continue Metamucil once daily  -Due for surveillance colonoscopy in 4/2026    Follow-up in the office in 1 year.    Signature: Gonzalez Chaudhry MD  "

## 2024-05-22 NOTE — PATIENT INSTRUCTIONS
Continue Dicyclomine up to 4 times per day as needed. Continue daily Metamucil. Follow-up in the office in 1 year or sooner if worsening symptoms.

## 2024-06-12 ENCOUNTER — APPOINTMENT (OUTPATIENT)
Dept: PRIMARY CARE | Facility: CLINIC | Age: 79
End: 2024-06-12
Payer: MEDICARE

## 2024-06-12 VITALS — SYSTOLIC BLOOD PRESSURE: 126 MMHG | DIASTOLIC BLOOD PRESSURE: 68 MMHG | RESPIRATION RATE: 15 BRPM | HEART RATE: 68 BPM

## 2024-06-12 DIAGNOSIS — K21.9 GERD WITHOUT ESOPHAGITIS: ICD-10-CM

## 2024-06-12 DIAGNOSIS — I10 PRIMARY HYPERTENSION: Primary | ICD-10-CM

## 2024-06-12 DIAGNOSIS — E11.9 TYPE 2 DIABETES MELLITUS WITHOUT COMPLICATION, WITHOUT LONG-TERM CURRENT USE OF INSULIN (MULTI): ICD-10-CM

## 2024-06-12 DIAGNOSIS — E78.00 PURE HYPERCHOLESTEROLEMIA: ICD-10-CM

## 2024-06-12 PROCEDURE — 3074F SYST BP LT 130 MM HG: CPT | Performed by: FAMILY MEDICINE

## 2024-06-12 PROCEDURE — 3078F DIAST BP <80 MM HG: CPT | Performed by: FAMILY MEDICINE

## 2024-06-12 PROCEDURE — 99214 OFFICE O/P EST MOD 30 MIN: CPT | Performed by: FAMILY MEDICINE

## 2024-06-12 RX ORDER — BLOOD SUGAR DIAGNOSTIC
1 STRIP MISCELLANEOUS 2 TIMES DAILY
Qty: 200 STRIP | Refills: 3 | Status: SHIPPED | OUTPATIENT
Start: 2024-06-12

## 2024-06-12 RX ORDER — HYDROGEN PEROXIDE 3 %
20 SOLUTION, NON-ORAL MISCELLANEOUS 2 TIMES DAILY
Qty: 200 CAPSULE | Refills: 3 | Status: SHIPPED | OUTPATIENT
Start: 2024-06-12 | End: 2025-06-12

## 2024-06-12 NOTE — ASSESSMENT & PLAN NOTE
DMII, well controlled. Continue current medications. Will monitor A1C, urine albumin. advise eye exam by an OD yearly and checking bilateral feet for skin lesions qhs. Healthy diet and regular exercise.

## 2024-06-12 NOTE — ASSESSMENT & PLAN NOTE
GERD:  Acid reflux has been well controlled with current ppi. Informed pt of the SE of long-term use of PPI such as kidney disease, low magnesium, C-dif infection, dementia, osteoporosis/fracture etc. Recommend to take ppi only for short term due to SE. Recommend to taper off ppi or switch to famotidine as tolerated.  Recommend to avoid lying down within 4 hours of eating food.  Elevate the head of bed by one foot. Avoid EOTH, mint, citrus, chocolate.

## 2024-06-12 NOTE — ASSESSMENT & PLAN NOTE
Hyperlipidemia on statin. No GI upset or muscle ache. Will monitor labs for evaluation.  Health diet and regular exercise. f/u in 3 mos.

## 2024-06-12 NOTE — PROGRESS NOTES
Subjective   Patient ID: Yinka Wasserman is a 79 y.o. male who presents for fu    HPI   Patient has been compliant with taking all  current medications. FBG has been at 100's most days. No hypoglycemic episodes. No polydipsia, polyuria or significant weight changes. No lower extremities skin lesion. stable LE numbness and  tingling. No blurry vision. No GI upset  or muscle ache while on statin for high lipids. Normal appetite. No CP, HA, dizziness, heart palpitation. No claudication or cold LE.  No LE edema. No falls. Good mood.   symptoms of acid reflux and heart burn have been well controlled with current ppi. Pt denies having trouble swallowing food. Pt has normal appetite. No nausea, vomiting, abd pain, wt loss, dark stools. Without current PPI, pt would have acid reflux, heart burn   Review of Systems    Objective   /68   Pulse 68   Resp 15     Physical Exam  NAD, well groomed, No sclera icterus. no cervical or axillary lymphadenopathy,  lungs: CTA b/l, heart: RRR, No LE edema, normal pedal pulses, abd: soft, no tenderness, BS+, normal strength   at bilateral lower extremities. No skin lesions at bilateral feet.  Good balance. CNII-XII were grossly intact, good judgment and memory. No depressed mood.    Assessment/Plan   Problem List Items Addressed This Visit             ICD-10-CM    Type 2 diabetes mellitus without complication, without long-term current use of insulin (Multi) E11.9     DMII, well controlled. Continue current medications. Will monitor A1C, urine albumin. advise eye exam by an OD yearly and checking bilateral feet for skin lesions qhs. Healthy diet and regular exercise.          Relevant Orders    Comprehensive Metabolic Panel    Albumin , Urine Random    Hemoglobin A1C    Pure hypercholesterolemia E78.00     Hyperlipidemia on statin. No GI upset or muscle ache. Will monitor labs for evaluation.  Health diet and regular exercise. f/u in 3 mos.            Primary hypertension - Primary  I10     BP has been controlled. Continue BP pills. DASH diet and regular exercise.          GERD without esophagitis K21.9     GERD:  Acid reflux has been well controlled with current ppi. Informed pt of the SE of long-term use of PPI such as kidney disease, low magnesium, C-dif infection, dementia, osteoporosis/fracture etc. Recommend to take ppi only for short term due to SE. Recommend to taper off ppi or switch to famotidine as tolerated.  Recommend to avoid lying down within 4 hours of eating food.  Elevate the head of bed by one foot. Avoid EOTH, mint, citrus, chocolate.          Relevant Medications    esomeprazole (NexIUM) 20 mg DR capsule    Other Relevant Orders    Vitamin B12    Magnesium    Vitamin D 25-Hydroxy,Total (for eval of Vitamin D levels)

## 2024-06-13 ENCOUNTER — LAB (OUTPATIENT)
Dept: LAB | Facility: LAB | Age: 79
End: 2024-06-13
Payer: MEDICARE

## 2024-06-13 DIAGNOSIS — K21.9 GERD WITHOUT ESOPHAGITIS: ICD-10-CM

## 2024-06-13 DIAGNOSIS — E11.9 TYPE 2 DIABETES MELLITUS WITHOUT COMPLICATION, WITHOUT LONG-TERM CURRENT USE OF INSULIN (MULTI): ICD-10-CM

## 2024-06-13 LAB
25(OH)D3 SERPL-MCNC: 37 NG/ML (ref 30–100)
ALBUMIN SERPL BCP-MCNC: 4.6 G/DL (ref 3.4–5)
ALP SERPL-CCNC: 59 U/L (ref 33–136)
ALT SERPL W P-5'-P-CCNC: 35 U/L (ref 10–52)
ANION GAP SERPL CALC-SCNC: 11 MMOL/L (ref 10–20)
AST SERPL W P-5'-P-CCNC: 28 U/L (ref 9–39)
BILIRUB SERPL-MCNC: 0.3 MG/DL (ref 0–1.2)
BUN SERPL-MCNC: 30 MG/DL (ref 6–23)
CALCIUM SERPL-MCNC: 9.7 MG/DL (ref 8.6–10.3)
CHLORIDE SERPL-SCNC: 99 MMOL/L (ref 98–107)
CO2 SERPL-SCNC: 30 MMOL/L (ref 21–32)
CREAT SERPL-MCNC: 1.18 MG/DL (ref 0.5–1.3)
CREAT UR-MCNC: 92 MG/DL (ref 20–370)
EGFRCR SERPLBLD CKD-EPI 2021: 62 ML/MIN/1.73M*2
EST. AVERAGE GLUCOSE BLD GHB EST-MCNC: 143 MG/DL
GLUCOSE SERPL-MCNC: 112 MG/DL (ref 74–99)
HBA1C MFR BLD: 6.6 %
MAGNESIUM SERPL-MCNC: 2.02 MG/DL (ref 1.6–2.4)
MICROALBUMIN UR-MCNC: 28.9 MG/L
MICROALBUMIN/CREAT UR: 31.4 UG/MG CREAT
POTASSIUM SERPL-SCNC: 4.5 MMOL/L (ref 3.5–5.3)
PROT SERPL-MCNC: 6.8 G/DL (ref 6.4–8.2)
SODIUM SERPL-SCNC: 135 MMOL/L (ref 136–145)
VIT B12 SERPL-MCNC: 942 PG/ML (ref 211–911)

## 2024-06-13 PROCEDURE — 83036 HEMOGLOBIN GLYCOSYLATED A1C: CPT

## 2024-06-13 PROCEDURE — 36415 COLL VENOUS BLD VENIPUNCTURE: CPT

## 2024-06-13 PROCEDURE — 83735 ASSAY OF MAGNESIUM: CPT

## 2024-06-13 PROCEDURE — 80053 COMPREHEN METABOLIC PANEL: CPT

## 2024-06-13 PROCEDURE — 82570 ASSAY OF URINE CREATININE: CPT

## 2024-06-13 PROCEDURE — 82306 VITAMIN D 25 HYDROXY: CPT

## 2024-06-13 PROCEDURE — 82043 UR ALBUMIN QUANTITATIVE: CPT

## 2024-06-13 PROCEDURE — 82607 VITAMIN B-12: CPT

## 2024-09-09 ENCOUNTER — APPOINTMENT (OUTPATIENT)
Dept: PRIMARY CARE | Facility: CLINIC | Age: 79
End: 2024-09-09
Payer: MEDICARE

## 2024-09-09 VITALS — SYSTOLIC BLOOD PRESSURE: 137 MMHG | HEART RATE: 76 BPM | DIASTOLIC BLOOD PRESSURE: 76 MMHG

## 2024-09-09 DIAGNOSIS — E11.9 TYPE 2 DIABETES MELLITUS WITHOUT COMPLICATION, WITHOUT LONG-TERM CURRENT USE OF INSULIN (MULTI): ICD-10-CM

## 2024-09-09 DIAGNOSIS — I10 PRIMARY HYPERTENSION: ICD-10-CM

## 2024-09-09 DIAGNOSIS — K21.9 GERD WITHOUT ESOPHAGITIS: ICD-10-CM

## 2024-09-09 DIAGNOSIS — E78.00 PURE HYPERCHOLESTEROLEMIA: Primary | ICD-10-CM

## 2024-09-09 PROCEDURE — G0008 ADMIN INFLUENZA VIRUS VAC: HCPCS | Performed by: FAMILY MEDICINE

## 2024-09-09 PROCEDURE — G2211 COMPLEX E/M VISIT ADD ON: HCPCS | Performed by: FAMILY MEDICINE

## 2024-09-09 PROCEDURE — 3075F SYST BP GE 130 - 139MM HG: CPT | Performed by: FAMILY MEDICINE

## 2024-09-09 PROCEDURE — 3078F DIAST BP <80 MM HG: CPT | Performed by: FAMILY MEDICINE

## 2024-09-09 PROCEDURE — 99214 OFFICE O/P EST MOD 30 MIN: CPT | Performed by: FAMILY MEDICINE

## 2024-09-09 PROCEDURE — 90656 IIV3 VACC NO PRSV 0.5 ML IM: CPT | Performed by: FAMILY MEDICINE

## 2024-09-09 NOTE — PROGRESS NOTES
Subjective   Patient ID: Yinka Wasserman is a 79 y.o. male who presents for fu    HPI   Patient has been compliant with taking all  current medications.  No hypoglycemic episodes. No polydipsia, polyuria or significant weight changes. No lower extremities skin lesion. No LE numbness or tingling. No blurry vision. No GI upset  or muscle ache while on statin for high lipids. Normal appetite. No CP, HA, dizziness, heart palpitation.  No imbalance or falls. Good mood. symptoms of acid reflux and heart burn have been well controlled with current ppi. Pepcid did not control his GERD. Pt denies having cough or wheezing. No trouble swallowing food. No nausea, vomiting, abd pain, wt loss, dark stools. Without current PPI, pt would have acid reflux, heart burn and dry cough.      Review of Systems    Objective   /76   Pulse 76     Physical Exam  NAD, well groomed, No sclera icterus. neck: supple, no cervical lymphadenopathy,  lungs: CTA b/l, heart: RRR, No LE edema, normal pedal pulses, abd: soft, no tenderness, BS+, normal strength and sensation  at bilateral lower extremities. No skin lesions at bilateral feet.  Good balance. CNII-XII were grossly intact, No depressed mood.    Assessment/Plan   Assessment & Plan  Pure hypercholesterolemia  Hyperlipidemia on statin. No GI upset or muscle ache. Will monitor labs for evaluation. Pt declined labs today.  Health diet and regular exercise. Decrease calorie intake to lose wt.  f/u in 6 mos.          Primary hypertension  BP has been controlled. Continue BP pills. keep a daily  bp log and bring in the log at the next office visit. Call office if BP is persistently over 130/80. DASH diet and regular exercise.      Type 2 diabetes mellitus without complication, without long-term current use of insulin (Multi)  DMII, well controlled. Continue current medications. Will monitor A1C, urine albumin. advise eye exam by an OD yearly and checking bilateral feet for skin lesions qhs.  Healthy diet and regular exercise.        GERD without esophagitis  GERD:  Acid reflux has been well controlled with current ppi. Prpcid did not help.  Informed pt of the SE of long-term use of PPI such as kidney disease, low magnesium, C-dif infection, dementia, osteoporosis/fracture etc. Recommend to take ppi only for short term due to SE. Recommend to taper off ppi as tolerated.  Recommend to avoid lying down within 4 hours of eating food.  Elevate the head of bed by one foot. Avoid EOTH, mint, citrus, chocolate.      Patient is not sick today. Patient is not anxious about getting a shot today. Patient has never had Guillain Barré syndrome. Patient has never felt dizzy or faint before, during, or after a shot. Patient has never had a serious reaction to influenza vaccine in the past.  Patient has never had an allergy to an ingredient of influenza vaccine.  Flu shot was given via IM today.

## 2024-09-09 NOTE — ASSESSMENT & PLAN NOTE
BP has been controlled. Continue BP pills. keep a daily  bp log and bring in the log at the next office visit. Call office if BP is persistently over 130/80. DASH diet and regular exercise.

## 2024-09-09 NOTE — ASSESSMENT & PLAN NOTE
Hyperlipidemia on statin. No GI upset or muscle ache. Will monitor labs for evaluation. Pt declined labs today.  Health diet and regular exercise. Decrease calorie intake to lose wt.  f/u in 6 mos.

## 2024-09-09 NOTE — ASSESSMENT & PLAN NOTE
GERD:  Acid reflux has been well controlled with current ppi. Prpcid did not help.  Informed pt of the SE of long-term use of PPI such as kidney disease, low magnesium, C-dif infection, dementia, osteoporosis/fracture etc. Recommend to take ppi only for short term due to SE. Recommend to taper off ppi as tolerated.  Recommend to avoid lying down within 4 hours of eating food.  Elevate the head of bed by one foot. Avoid EOTH, mint, citrus, chocolate.      Patient is not sick today. Patient is not anxious about getting a shot today. Patient has never had Guillain Barré syndrome. Patient has never felt dizzy or faint before, during, or after a shot. Patient has never had a serious reaction to influenza vaccine in the past.  Patient has never had an allergy to an ingredient of influenza vaccine.  Flu shot was given via IM today.

## 2024-09-13 ENCOUNTER — APPOINTMENT (OUTPATIENT)
Dept: PRIMARY CARE | Facility: CLINIC | Age: 79
End: 2024-09-13
Payer: MEDICARE

## 2024-10-01 DIAGNOSIS — E11.9 TYPE 2 DIABETES MELLITUS WITHOUT COMPLICATION, WITHOUT LONG-TERM CURRENT USE OF INSULIN (MULTI): ICD-10-CM

## 2024-10-01 RX ORDER — METFORMIN HYDROCHLORIDE 500 MG/1
500 TABLET ORAL 2 TIMES DAILY
Qty: 180 TABLET | Refills: 3 | Status: SHIPPED | OUTPATIENT
Start: 2024-10-01

## 2024-10-16 DIAGNOSIS — R35.0 BENIGN PROSTATIC HYPERPLASIA WITH URINARY FREQUENCY: ICD-10-CM

## 2024-10-16 DIAGNOSIS — N40.1 BENIGN PROSTATIC HYPERPLASIA WITH URINARY FREQUENCY: ICD-10-CM

## 2024-10-16 RX ORDER — TAMSULOSIN HYDROCHLORIDE 0.4 MG/1
0.4 CAPSULE ORAL 2 TIMES DAILY
Qty: 180 CAPSULE | Refills: 3 | Status: SHIPPED | OUTPATIENT
Start: 2024-10-16

## 2024-11-08 DIAGNOSIS — E78.00 PURE HYPERCHOLESTEROLEMIA: ICD-10-CM

## 2024-11-08 RX ORDER — ROSUVASTATIN CALCIUM 40 MG/1
40 TABLET, COATED ORAL DAILY
Qty: 90 TABLET | Refills: 3 | Status: SHIPPED | OUTPATIENT
Start: 2024-11-08

## 2025-03-03 ENCOUNTER — APPOINTMENT (OUTPATIENT)
Dept: PRIMARY CARE | Facility: CLINIC | Age: 80
End: 2025-03-03
Payer: MEDICARE

## 2025-03-03 VITALS
WEIGHT: 143 LBS | DIASTOLIC BLOOD PRESSURE: 66 MMHG | HEART RATE: 72 BPM | SYSTOLIC BLOOD PRESSURE: 122 MMHG | HEIGHT: 69 IN | RESPIRATION RATE: 14 BRPM | BODY MASS INDEX: 21.18 KG/M2

## 2025-03-03 DIAGNOSIS — R35.0 BENIGN PROSTATIC HYPERPLASIA WITH URINARY FREQUENCY: ICD-10-CM

## 2025-03-03 DIAGNOSIS — K21.9 GERD WITHOUT ESOPHAGITIS: ICD-10-CM

## 2025-03-03 DIAGNOSIS — E11.9 TYPE 2 DIABETES MELLITUS WITHOUT COMPLICATION, WITHOUT LONG-TERM CURRENT USE OF INSULIN (MULTI): Primary | ICD-10-CM

## 2025-03-03 DIAGNOSIS — I10 PRIMARY HYPERTENSION: ICD-10-CM

## 2025-03-03 DIAGNOSIS — N40.1 BENIGN PROSTATIC HYPERPLASIA WITH URINARY FREQUENCY: ICD-10-CM

## 2025-03-03 DIAGNOSIS — Z00.00 ROUTINE MEDICAL EXAM: ICD-10-CM

## 2025-03-03 DIAGNOSIS — E78.00 PURE HYPERCHOLESTEROLEMIA: ICD-10-CM

## 2025-03-03 PROBLEM — D64.9 ANEMIA: Status: RESOLVED | Noted: 2023-06-19 | Resolved: 2025-03-03

## 2025-03-03 PROBLEM — R15.9 STOOL INCONTINENCE: Status: RESOLVED | Noted: 2023-09-23 | Resolved: 2025-03-03

## 2025-03-03 PROBLEM — D72.819 LEUKOPENIA: Status: RESOLVED | Noted: 2023-06-19 | Resolved: 2025-03-03

## 2025-03-03 PROBLEM — R14.0 ABDOMINAL BLOATING: Status: RESOLVED | Noted: 2024-01-15 | Resolved: 2025-03-03

## 2025-03-03 PROCEDURE — G2211 COMPLEX E/M VISIT ADD ON: HCPCS | Performed by: FAMILY MEDICINE

## 2025-03-03 PROCEDURE — 1170F FXNL STATUS ASSESSED: CPT | Performed by: FAMILY MEDICINE

## 2025-03-03 PROCEDURE — 3078F DIAST BP <80 MM HG: CPT | Performed by: FAMILY MEDICINE

## 2025-03-03 PROCEDURE — G0439 PPPS, SUBSEQ VISIT: HCPCS | Performed by: FAMILY MEDICINE

## 2025-03-03 PROCEDURE — 1036F TOBACCO NON-USER: CPT | Performed by: FAMILY MEDICINE

## 2025-03-03 PROCEDURE — 1159F MED LIST DOCD IN RCRD: CPT | Performed by: FAMILY MEDICINE

## 2025-03-03 PROCEDURE — 99214 OFFICE O/P EST MOD 30 MIN: CPT | Performed by: FAMILY MEDICINE

## 2025-03-03 PROCEDURE — 1160F RVW MEDS BY RX/DR IN RCRD: CPT | Performed by: FAMILY MEDICINE

## 2025-03-03 PROCEDURE — 3074F SYST BP LT 130 MM HG: CPT | Performed by: FAMILY MEDICINE

## 2025-03-03 RX ORDER — LISINOPRIL 10 MG/1
10 TABLET ORAL DAILY
Qty: 90 TABLET | Refills: 3 | Status: SHIPPED | OUTPATIENT
Start: 2025-03-03

## 2025-03-03 RX ORDER — METFORMIN HYDROCHLORIDE 500 MG/1
500 TABLET ORAL 2 TIMES DAILY
Qty: 180 TABLET | Refills: 3 | Status: SHIPPED | OUTPATIENT
Start: 2025-03-03

## 2025-03-03 RX ORDER — FAMOTIDINE 40 MG/1
40 TABLET, FILM COATED ORAL NIGHTLY
Qty: 90 TABLET | Refills: 3 | Status: SHIPPED | OUTPATIENT
Start: 2025-03-03 | End: 2026-03-03

## 2025-03-03 RX ORDER — HYDROGEN PEROXIDE 3 %
20 SOLUTION, NON-ORAL MISCELLANEOUS 2 TIMES DAILY
Qty: 200 CAPSULE | Refills: 3 | Status: SHIPPED | OUTPATIENT
Start: 2025-03-03 | End: 2026-03-03

## 2025-03-03 RX ORDER — ROSUVASTATIN CALCIUM 40 MG/1
40 TABLET, COATED ORAL DAILY
Qty: 90 TABLET | Refills: 3 | Status: SHIPPED | OUTPATIENT
Start: 2025-03-03

## 2025-03-03 RX ORDER — TAMSULOSIN HYDROCHLORIDE 0.4 MG/1
0.4 CAPSULE ORAL DAILY
Qty: 90 CAPSULE | Refills: 3 | Status: SHIPPED | OUTPATIENT
Start: 2025-03-03

## 2025-03-03 ASSESSMENT — ACTIVITIES OF DAILY LIVING (ADL)
DRESSING: INDEPENDENT
DOING_HOUSEWORK: INDEPENDENT
MANAGING_FINANCES: INDEPENDENT
BATHING: INDEPENDENT
GROCERY_SHOPPING: INDEPENDENT
TAKING_MEDICATION: INDEPENDENT

## 2025-03-03 ASSESSMENT — PATIENT HEALTH QUESTIONNAIRE - PHQ9
2. FEELING DOWN, DEPRESSED OR HOPELESS: NOT AT ALL
1. LITTLE INTEREST OR PLEASURE IN DOING THINGS: NOT AT ALL
SUM OF ALL RESPONSES TO PHQ9 QUESTIONS 1 AND 2: 0

## 2025-03-03 NOTE — PROGRESS NOTES
"Subjective   Patient ID: Yinka Wasserman is a 79 y.o. male who presents for Medicare Annual Wellness Visit Subsequent (fu).    HPI   Patient has been compliant with taking all  current medications. FBG has been at 120's most days. No hypoglycemic episodes. No polyuria or significant weight changes. No lower extremities skin lesion. Occ  LE numbness and tingling. No blurry vision. No GI upset  or muscle ache while on statin for high lipids. Normal appetite. No CP, HA, dizziness, heart palpitation. No  LE edema. No imbalance or falls. Good mood. symptoms of acid reflux and heart burn have been well controlled with current ppi. No trouble swallowing food. No nausea, vomiting, abd pain, wt loss, dark stools. Stable urination while on flomax    Review of Systems    Objective   /66   Pulse 72   Resp 14   Ht 1.753 m (5' 9\")   Wt 64.9 kg (143 lb)   BMI 21.12 kg/m²     Physical Exam  NAD, well groomed, No sclera icterus. lungs: CTA b/l, heart: RRR, No LE edema, normal pedal pulses, abd: soft, no tenderness, BS+, normal strength and sensation  at bilateral lower extremities. No skin lesions at bilateral feet.  Good balance. CNII-XII were grossly intact, good judgment and memory. No depressed mood.      Assessment/Plan   Assessment & Plan  GERD without esophagitis  GERD:  Acid reflux has been well controlled with current ppi. Informed pt that the side effects of long-term use of PPI such as omeprazole or pantoprazole include kidney disease, low magnesium, C-difficile infection, dementia, osteoporosis/fracture etc. Recommend to take PPI only for short term due to side effects. Recommend to taper off PPI or switch to famotidine as tolerated.  Recommend to avoid lying down within 4 hours of eating food.  Elevate the head of bed by one foot. Avoid alcohol, mint, citrus, chocolate. Recommend  GI  evaluation. Pt declined.    Orders:    esomeprazole (NexIUM) 20 mg DR capsule; Take 1 capsule (20 mg) by mouth 2 times a day. " Do not open capsule.    famotidine (Pepcid) 40 mg tablet; Take 1 tablet (40 mg) by mouth once daily at bedtime.    Primary hypertension  BP has been controlled. Continue BP pills. keep a daily  bp log and bring in the log at the next office visit. Call office if BP is persistently over 130/80. DASH diet and regular exercise.     Orders:    lisinopril 10 mg tablet; Take 1 tablet (10 mg) by mouth once daily.    Type 2 diabetes mellitus without complication, without long-term current use of insulin (Multi)  DMII, well controlled. Continue current medications. Will monitor A1C, urine albumin. advise eye exam by an OD yearly and checking bilateral feet for skin lesions qhs. Healthy diet and regular exercise.   Orders:    metFORMIN (Glucophage) 500 mg tablet; Take 1 tablet (500 mg) by mouth 2 times a day.    Lipid Panel; Future    Comprehensive Metabolic Panel; Future    Hemoglobin A1C; Future    Albumin-Creatinine Ratio, Urine Random; Future    Pure hypercholesterolemia  Hyperlipidemia on statin. No GI upset or muscle ache. Will monitor labs for evaluation.  Health diet and regular exercise.  f/u in 6 mos.     Orders:    rosuvastatin (Crestor) 40 mg tablet; Take 1 tablet (40 mg) by mouth once daily.    Lipid Panel; Future    Comprehensive Metabolic Panel; Future    Benign prostatic hyperplasia with urinary frequency  Controlled. Cont the same  Orders:    tamsulosin (Flomax) 0.4 mg 24 hr capsule; Take 1 capsule (0.4 mg) by mouth once daily.    Routine medical exam [Z00.00]  Medicare wellness visit: pt was capable of performing all ADLs and IADLs. Pt has good memory and cognitive function. Advise eye exam by an OD yearly for glaucoma screen and dental exam every 6 months. check lipids.   will monitor blood pressure, cholesterol levels and weight regularly.  Recommend shingle vaccines, tetanus vaccine,  RSV, and covid shot.   Recommend pt to clear throw rugs at home and keep good lighting at night to avoid falls. recommend  grab bar at [facility]. recommend to update living will and DPOA  pt  had been taking all current  medications as prescribed.

## 2025-03-03 NOTE — ASSESSMENT & PLAN NOTE
Medicare wellness visit: pt was capable of performing all ADLs and IADLs. Pt has good memory and cognitive function. Advise eye exam by an OD yearly for glaucoma screen and dental exam every 6 months. check lipids.   will monitor blood pressure, cholesterol levels and weight regularly.  Recommend shingle vaccines, tetanus vaccine,  RSV, and covid shot.   Recommend pt to clear throw rugs at home and keep good lighting at night to avoid falls. recommend grab bar at bathtub. recommend to update living will and DPOA  pt  had been taking all current  medications as prescribed.

## 2025-03-03 NOTE — ASSESSMENT & PLAN NOTE
BP has been controlled. Continue BP pills. keep a daily  bp log and bring in the log at the next office visit. Call office if BP is persistently over 130/80. DASH diet and regular exercise.     Orders:    lisinopril 10 mg tablet; Take 1 tablet (10 mg) by mouth once daily.

## 2025-03-03 NOTE — ASSESSMENT & PLAN NOTE
GERD:  Acid reflux has been well controlled with current ppi. Informed pt that the side effects of long-term use of PPI such as omeprazole or pantoprazole include kidney disease, low magnesium, C-difficile infection, dementia, osteoporosis/fracture etc. Recommend to take PPI only for short term due to side effects. Recommend to taper off PPI or switch to famotidine as tolerated.  Recommend to avoid lying down within 4 hours of eating food.  Elevate the head of bed by one foot. Avoid alcohol, mint, citrus, chocolate. Recommend  GI  evaluation. Pt declined.    Orders:    esomeprazole (NexIUM) 20 mg DR capsule; Take 1 capsule (20 mg) by mouth 2 times a day. Do not open capsule.    famotidine (Pepcid) 40 mg tablet; Take 1 tablet (40 mg) by mouth once daily at bedtime.

## 2025-03-03 NOTE — ASSESSMENT & PLAN NOTE
Controlled. Cont the same  Orders:    tamsulosin (Flomax) 0.4 mg 24 hr capsule; Take 1 capsule (0.4 mg) by mouth once daily.

## 2025-03-03 NOTE — ASSESSMENT & PLAN NOTE
DMII, well controlled. Continue current medications. Will monitor A1C, urine albumin. advise eye exam by an OD yearly and checking bilateral feet for skin lesions qhs. Healthy diet and regular exercise.   Orders:    metFORMIN (Glucophage) 500 mg tablet; Take 1 tablet (500 mg) by mouth 2 times a day.    Lipid Panel; Future    Comprehensive Metabolic Panel; Future    Hemoglobin A1C; Future    Albumin-Creatinine Ratio, Urine Random; Future

## 2025-03-05 LAB
ALBUMIN SERPL-MCNC: 5.1 G/DL (ref 3.6–5.1)
ALBUMIN/CREAT UR: 17 MG/G CREAT
ALP SERPL-CCNC: 60 U/L (ref 35–144)
ALT SERPL-CCNC: 39 U/L (ref 9–46)
ANION GAP SERPL CALCULATED.4IONS-SCNC: 9 MMOL/L (CALC) (ref 7–17)
AST SERPL-CCNC: 31 U/L (ref 10–35)
BILIRUB SERPL-MCNC: 0.5 MG/DL (ref 0.2–1.2)
BUN SERPL-MCNC: 24 MG/DL (ref 7–25)
CALCIUM SERPL-MCNC: 9.8 MG/DL (ref 8.6–10.3)
CHLORIDE SERPL-SCNC: 101 MMOL/L (ref 98–110)
CHOLEST SERPL-MCNC: 159 MG/DL
CHOLEST/HDLC SERPL: 2.4 (CALC)
CO2 SERPL-SCNC: 28 MMOL/L (ref 20–32)
CREAT SERPL-MCNC: 1.31 MG/DL (ref 0.7–1.22)
CREAT UR-MCNC: 112 MG/DL (ref 20–320)
EGFRCR SERPLBLD CKD-EPI 2021: 55 ML/MIN/1.73M2
EST. AVERAGE GLUCOSE BLD GHB EST-MCNC: 143 MG/DL
EST. AVERAGE GLUCOSE BLD GHB EST-SCNC: 7.9 MMOL/L
GLUCOSE SERPL-MCNC: 112 MG/DL (ref 65–99)
HBA1C MFR BLD: 6.6 % OF TOTAL HGB
HDLC SERPL-MCNC: 66 MG/DL
LDLC SERPL CALC-MCNC: 77 MG/DL (CALC)
MICROALBUMIN UR-MCNC: 1.9 MG/DL
NONHDLC SERPL-MCNC: 93 MG/DL (CALC)
POTASSIUM SERPL-SCNC: 5 MMOL/L (ref 3.5–5.3)
PROT SERPL-MCNC: 7.5 G/DL (ref 6.1–8.1)
SODIUM SERPL-SCNC: 138 MMOL/L (ref 135–146)
TRIGL SERPL-MCNC: 82 MG/DL

## 2025-05-05 ENCOUNTER — APPOINTMENT (OUTPATIENT)
Dept: GASTROENTEROLOGY | Facility: CLINIC | Age: 80
End: 2025-05-05
Payer: MEDICARE

## 2025-06-23 ENCOUNTER — APPOINTMENT (OUTPATIENT)
Dept: GASTROENTEROLOGY | Facility: CLINIC | Age: 80
End: 2025-06-23
Payer: MEDICARE

## 2025-06-23 VITALS — OXYGEN SATURATION: 98 % | HEIGHT: 69 IN | WEIGHT: 145 LBS | BODY MASS INDEX: 21.48 KG/M2 | HEART RATE: 56 BPM

## 2025-06-23 DIAGNOSIS — Z86.0101 HISTORY OF ADENOMATOUS POLYP OF COLON: Primary | ICD-10-CM

## 2025-06-23 DIAGNOSIS — R19.4 CHANGE IN BOWEL HABITS: ICD-10-CM

## 2025-06-23 PROCEDURE — 1159F MED LIST DOCD IN RCRD: CPT | Performed by: INTERNAL MEDICINE

## 2025-06-23 PROCEDURE — 99214 OFFICE O/P EST MOD 30 MIN: CPT | Performed by: INTERNAL MEDICINE

## 2025-06-23 PROCEDURE — 1036F TOBACCO NON-USER: CPT | Performed by: INTERNAL MEDICINE

## 2025-06-23 RX ORDER — POLYETHYLENE GLYCOL 3350, SODIUM SULFATE ANHYDROUS, SODIUM BICARBONATE, SODIUM CHLORIDE, POTASSIUM CHLORIDE 236; 22.74; 6.74; 5.86; 2.97 G/4L; G/4L; G/4L; G/4L; G/4L
4 POWDER, FOR SOLUTION ORAL ONCE
Qty: 4000 ML | Refills: 0 | Status: SHIPPED | OUTPATIENT
Start: 2025-06-23 | End: 2025-06-23

## 2025-06-23 ASSESSMENT — ENCOUNTER SYMPTOMS: SHORTNESS OF BREATH: 0

## 2025-06-23 NOTE — PATIENT INSTRUCTIONS
Schedule colonoscopy at [facility]. Use Metamucil 1 serving (2 teaspoonfuls mixed with 8 ounces of juice or water) consistent on a daily basis. Increase to twice daily if still struggling with constipation.

## 2025-07-01 ENCOUNTER — APPOINTMENT (OUTPATIENT)
Dept: GASTROENTEROLOGY | Facility: CLINIC | Age: 80
End: 2025-07-01
Payer: MEDICARE

## 2025-09-04 ENCOUNTER — APPOINTMENT (OUTPATIENT)
Dept: PRIMARY CARE | Facility: CLINIC | Age: 80
End: 2025-09-04
Payer: MEDICARE

## 2026-02-26 ENCOUNTER — APPOINTMENT (OUTPATIENT)
Dept: PRIMARY CARE | Facility: CLINIC | Age: 81
End: 2026-02-26
Payer: MEDICARE